# Patient Record
Sex: MALE | Race: WHITE | NOT HISPANIC OR LATINO | Employment: FULL TIME | URBAN - METROPOLITAN AREA
[De-identification: names, ages, dates, MRNs, and addresses within clinical notes are randomized per-mention and may not be internally consistent; named-entity substitution may affect disease eponyms.]

---

## 2023-12-09 ENCOUNTER — HOSPITAL ENCOUNTER (EMERGENCY)
Facility: HOSPITAL | Age: 38
Discharge: HOME | End: 2023-12-10
Attending: EMERGENCY MEDICINE | Admitting: EMERGENCY MEDICINE
Payer: MEDICAID

## 2023-12-09 ENCOUNTER — APPOINTMENT (OUTPATIENT)
Dept: CT IMAGING | Facility: HOSPITAL | Age: 38
End: 2023-12-09
Payer: MEDICAID

## 2023-12-09 DIAGNOSIS — K80.50 BILIARY COLIC: Primary | ICD-10-CM

## 2023-12-09 LAB
ALBUMIN SERPL-MCNC: 4.6 G/DL (ref 3.5–5.2)
ALBUMIN/GLOB SERPL: 2.1 G/DL
ALP SERPL-CCNC: 78 U/L (ref 39–117)
ALT SERPL W P-5'-P-CCNC: 43 U/L (ref 1–41)
ANION GAP SERPL CALCULATED.3IONS-SCNC: 9 MMOL/L (ref 5–15)
AST SERPL-CCNC: 32 U/L (ref 1–40)
BASOPHILS # BLD AUTO: 0.04 10*3/MM3 (ref 0–0.2)
BASOPHILS NFR BLD AUTO: 0.4 % (ref 0–1.5)
BILIRUB SERPL-MCNC: 0.6 MG/DL (ref 0–1.2)
BUN SERPL-MCNC: 16 MG/DL (ref 6–20)
BUN/CREAT SERPL: 12.9 (ref 7–25)
CALCIUM SPEC-SCNC: 9.6 MG/DL (ref 8.6–10.5)
CHLORIDE SERPL-SCNC: 97 MMOL/L (ref 98–107)
CO2 SERPL-SCNC: 28 MMOL/L (ref 22–29)
CREAT SERPL-MCNC: 1.24 MG/DL (ref 0.76–1.27)
DEPRECATED RDW RBC AUTO: 40.6 FL (ref 37–54)
EGFRCR SERPLBLD CKD-EPI 2021: 77.3 ML/MIN/1.73
EOSINOPHIL # BLD AUTO: 0.06 10*3/MM3 (ref 0–0.4)
EOSINOPHIL NFR BLD AUTO: 0.7 % (ref 0.3–6.2)
ERYTHROCYTE [DISTWIDTH] IN BLOOD BY AUTOMATED COUNT: 13 % (ref 12.3–15.4)
GLOBULIN UR ELPH-MCNC: 2.2 GM/DL
GLUCOSE SERPL-MCNC: 124 MG/DL (ref 65–99)
HCT VFR BLD AUTO: 55.2 % (ref 37.5–51)
HGB BLD-MCNC: 18.1 G/DL (ref 13–17.7)
IMM GRANULOCYTES # BLD AUTO: 0.01 10*3/MM3 (ref 0–0.05)
IMM GRANULOCYTES NFR BLD AUTO: 0.1 % (ref 0–0.5)
LIPASE SERPL-CCNC: 25 U/L (ref 13–60)
LYMPHOCYTES # BLD AUTO: 1.59 10*3/MM3 (ref 0.7–3.1)
LYMPHOCYTES NFR BLD AUTO: 17.8 % (ref 19.6–45.3)
MCH RBC QN AUTO: 28.4 PG (ref 26.6–33)
MCHC RBC AUTO-ENTMCNC: 32.8 G/DL (ref 31.5–35.7)
MCV RBC AUTO: 86.7 FL (ref 79–97)
MONOCYTES # BLD AUTO: 0.55 10*3/MM3 (ref 0.1–0.9)
MONOCYTES NFR BLD AUTO: 6.2 % (ref 5–12)
NEUTROPHILS NFR BLD AUTO: 6.66 10*3/MM3 (ref 1.7–7)
NEUTROPHILS NFR BLD AUTO: 74.8 % (ref 42.7–76)
PLATELET # BLD AUTO: 192 10*3/MM3 (ref 140–450)
PMV BLD AUTO: 10.4 FL (ref 6–12)
POTASSIUM SERPL-SCNC: 3.8 MMOL/L (ref 3.5–5.2)
PROT SERPL-MCNC: 6.8 G/DL (ref 6–8.5)
RBC # BLD AUTO: 6.37 10*6/MM3 (ref 4.14–5.8)
SODIUM SERPL-SCNC: 134 MMOL/L (ref 136–145)
TROPONIN T SERPL HS-MCNC: 15 NG/L
WBC NRBC COR # BLD AUTO: 8.91 10*3/MM3 (ref 3.4–10.8)

## 2023-12-09 PROCEDURE — 74177 CT ABD & PELVIS W/CONTRAST: CPT

## 2023-12-09 PROCEDURE — 93005 ELECTROCARDIOGRAM TRACING: CPT | Performed by: EMERGENCY MEDICINE

## 2023-12-09 PROCEDURE — 80053 COMPREHEN METABOLIC PANEL: CPT | Performed by: EMERGENCY MEDICINE

## 2023-12-09 PROCEDURE — 84484 ASSAY OF TROPONIN QUANT: CPT | Performed by: EMERGENCY MEDICINE

## 2023-12-09 PROCEDURE — 96374 THER/PROPH/DIAG INJ IV PUSH: CPT

## 2023-12-09 PROCEDURE — 25010000002 ONDANSETRON PER 1 MG: Performed by: EMERGENCY MEDICINE

## 2023-12-09 PROCEDURE — 83690 ASSAY OF LIPASE: CPT | Performed by: EMERGENCY MEDICINE

## 2023-12-09 PROCEDURE — 85025 COMPLETE CBC W/AUTO DIFF WBC: CPT | Performed by: EMERGENCY MEDICINE

## 2023-12-09 PROCEDURE — 99285 EMERGENCY DEPT VISIT HI MDM: CPT

## 2023-12-09 PROCEDURE — 96375 TX/PRO/DX INJ NEW DRUG ADDON: CPT

## 2023-12-09 RX ORDER — FAMOTIDINE 10 MG/ML
20 INJECTION, SOLUTION INTRAVENOUS ONCE
Status: COMPLETED | OUTPATIENT
Start: 2023-12-10 | End: 2023-12-09

## 2023-12-09 RX ORDER — ONDANSETRON 2 MG/ML
4 INJECTION INTRAMUSCULAR; INTRAVENOUS ONCE
Status: COMPLETED | OUTPATIENT
Start: 2023-12-10 | End: 2023-12-09

## 2023-12-09 RX ORDER — SODIUM CHLORIDE 0.9 % (FLUSH) 0.9 %
10 SYRINGE (ML) INJECTION AS NEEDED
Status: DISCONTINUED | OUTPATIENT
Start: 2023-12-09 | End: 2023-12-10 | Stop reason: HOSPADM

## 2023-12-09 RX ADMIN — ONDANSETRON 4 MG: 2 INJECTION INTRAMUSCULAR; INTRAVENOUS at 23:49

## 2023-12-09 RX ADMIN — FAMOTIDINE 20 MG: 10 INJECTION INTRAVENOUS at 23:49

## 2023-12-09 NOTE — Clinical Note
[facility] [place] [place] [address] [place] [address] [place] [address]  Phone: [phone]    Henrik Mojica was seen and treated in our emergency department on 12/9/2023.  He may return to work on 12/12/2023.         Thank you for choosing [facility].    Kathe Alexander MD

## 2023-12-10 VITALS
BODY MASS INDEX: 31.71 KG/M2 | HEIGHT: 75 IN | WEIGHT: 255 LBS | HEART RATE: 58 BPM | RESPIRATION RATE: 18 BRPM | OXYGEN SATURATION: 98 % | DIASTOLIC BLOOD PRESSURE: 87 MMHG | SYSTOLIC BLOOD PRESSURE: 133 MMHG | TEMPERATURE: 98 F

## 2023-12-10 LAB
BILIRUB UR QL STRIP: NEGATIVE
CLARITY UR: CLEAR
COLOR UR: YELLOW
GLUCOSE UR STRIP-MCNC: NEGATIVE MG/DL
HGB UR QL STRIP.AUTO: NEGATIVE
KETONES UR QL STRIP: NEGATIVE
LEUKOCYTE ESTERASE UR QL STRIP.AUTO: NEGATIVE
NITRITE UR QL STRIP: NEGATIVE
PH UR STRIP.AUTO: 7 [PH] (ref 5–8)
PROT UR QL STRIP: NEGATIVE
SP GR UR STRIP: 1.01 (ref 1–1.03)
UROBILINOGEN UR QL STRIP: NORMAL

## 2023-12-10 PROCEDURE — 96375 TX/PRO/DX INJ NEW DRUG ADDON: CPT

## 2023-12-10 PROCEDURE — 96376 TX/PRO/DX INJ SAME DRUG ADON: CPT

## 2023-12-10 PROCEDURE — 25010000002 KETOROLAC TROMETHAMINE PER 15 MG: Performed by: EMERGENCY MEDICINE

## 2023-12-10 PROCEDURE — 81003 URINALYSIS AUTO W/O SCOPE: CPT | Performed by: EMERGENCY MEDICINE

## 2023-12-10 PROCEDURE — 25510000001 IOPAMIDOL PER 1 ML: Performed by: EMERGENCY MEDICINE

## 2023-12-10 PROCEDURE — 25010000002 ONDANSETRON PER 1 MG: Performed by: EMERGENCY MEDICINE

## 2023-12-10 PROCEDURE — 25010000002 MORPHINE PER 10 MG: Performed by: EMERGENCY MEDICINE

## 2023-12-10 RX ORDER — DICYCLOMINE HYDROCHLORIDE 10 MG/1
10 CAPSULE ORAL 3 TIMES DAILY PRN
Qty: 15 CAPSULE | Refills: 0 | Status: SHIPPED | OUTPATIENT
Start: 2023-12-10

## 2023-12-10 RX ORDER — FAMOTIDINE 20 MG/1
20 TABLET, FILM COATED ORAL NIGHTLY
Qty: 15 TABLET | Refills: 0 | Status: SHIPPED | OUTPATIENT
Start: 2023-12-10

## 2023-12-10 RX ORDER — KETOROLAC TROMETHAMINE 15 MG/ML
15 INJECTION, SOLUTION INTRAMUSCULAR; INTRAVENOUS ONCE
Status: COMPLETED | OUTPATIENT
Start: 2023-12-10 | End: 2023-12-10

## 2023-12-10 RX ORDER — MORPHINE SULFATE 2 MG/ML
2 INJECTION, SOLUTION INTRAMUSCULAR; INTRAVENOUS ONCE
Status: COMPLETED | OUTPATIENT
Start: 2023-12-10 | End: 2023-12-10

## 2023-12-10 RX ORDER — LIDOCAINE HYDROCHLORIDE 20 MG/ML
5 SOLUTION OROPHARYNGEAL ONCE
Status: COMPLETED | OUTPATIENT
Start: 2023-12-10 | End: 2023-12-10

## 2023-12-10 RX ORDER — ONDANSETRON 4 MG/1
4 TABLET, ORALLY DISINTEGRATING ORAL EVERY 8 HOURS PRN
Qty: 12 TABLET | Refills: 0 | Status: SHIPPED | OUTPATIENT
Start: 2023-12-10

## 2023-12-10 RX ORDER — ONDANSETRON 2 MG/ML
4 INJECTION INTRAMUSCULAR; INTRAVENOUS ONCE
Status: COMPLETED | OUTPATIENT
Start: 2023-12-10 | End: 2023-12-10

## 2023-12-10 RX ADMIN — ONDANSETRON 4 MG: 2 INJECTION INTRAMUSCULAR; INTRAVENOUS at 01:46

## 2023-12-10 RX ADMIN — IOPAMIDOL 100 ML: 755 INJECTION, SOLUTION INTRAVENOUS at 00:01

## 2023-12-10 RX ADMIN — LIDOCAINE HYDROCHLORIDE 5 ML: 20 SOLUTION ORAL at 00:47

## 2023-12-10 RX ADMIN — KETOROLAC TROMETHAMINE 15 MG: 15 INJECTION, SOLUTION INTRAMUSCULAR; INTRAVENOUS at 01:13

## 2023-12-10 RX ADMIN — Medication 10 ML: at 01:07

## 2023-12-10 RX ADMIN — MORPHINE SULFATE 2 MG: 2 INJECTION, SOLUTION INTRAMUSCULAR; INTRAVENOUS at 01:45

## 2023-12-10 RX ADMIN — Medication 10 ML: at 01:18

## 2023-12-10 RX ADMIN — ALUMINUM HYDROXIDE, MAGNESIUM HYDROXIDE, AND DIMETHICONE: 400; 400; 40 SUSPENSION ORAL at 00:47

## 2023-12-10 NOTE — FSED PROVIDER NOTE
Subjective   History of Present Illness  38 yom complains of upper abdominal pain. The patient states it started earlier today after eating some fried food. He notes it is in the upper part of his stomach. He has vomited several times. No chest pain, back pain, shortness of breath or diarrhea. No sick contacts or bad food exposure. No recent travel.       Review of Systems   Constitutional: Negative.    Respiratory: Negative.  Negative for chest tightness and shortness of breath.    Cardiovascular: Negative.  Negative for chest pain.   Gastrointestinal:  Positive for abdominal pain, nausea and vomiting.   Musculoskeletal: Negative.  Negative for back pain.   All other systems reviewed and are negative.      History reviewed. No pertinent past medical history.    Allergies   Allergen Reactions   • Penicillins Other (See Comments)     Family history       History reviewed. No pertinent surgical history.    History reviewed. No pertinent family history.    Social History     Socioeconomic History   • Marital status:            Objective   Physical Exam  Vitals reviewed.   Constitutional:       General: He is not in acute distress.     Appearance: He is well-developed. He is not ill-appearing.   HENT:      Head: Normocephalic and atraumatic.      Mouth/Throat:      Mouth: Mucous membranes are moist.      Pharynx: Oropharynx is clear.   Eyes:      Extraocular Movements: Extraocular movements intact.      Pupils: Pupils are equal, round, and reactive to light.   Cardiovascular:      Rate and Rhythm: Normal rate and regular rhythm.      Heart sounds: Normal heart sounds.   Pulmonary:      Effort: Pulmonary effort is normal.      Breath sounds: Normal breath sounds.   Abdominal:      General: Abdomen is flat. Bowel sounds are normal.      Palpations: Abdomen is soft.      Tenderness: There is abdominal tenderness in the epigastric area. There is no guarding or rebound. Negative signs include Hector's sign.   Skin:      General: Skin is warm and dry.      Capillary Refill: Capillary refill takes less than 2 seconds.   Neurological:      Mental Status: He is alert.       ECG 12 Lead      Date/Time: 12/9/2023 10:48 PM    Performed by: Kathe Alexander MD  Authorized by: Kathe Alexander MD  Interpreted by physician  Comparison: not compared with previous ECG   Rhythm: sinus rhythm  Rate: normal  BPM: 62  Conduction: conduction normal  ST Segments: ST segments normal  Clinical impression: normal ECG           ED Course                                           Medical Decision Making    Presentation most consistent with biliary colic, without evidence of infection.  History and exam AAA, pancreatitis, SBO, appendicitis, mesenteric ischemia, nephrolithiasis, pyelonephritis, or diverticulitis.    ED Interventions: analgesia PRN  ED Workup: CBC, BMP, LFTs, Lipase, Abdominal CT    Disposition: Refer to general surgery outpatient. Discharge home with instructions for primary care follow up within 24-48 hours.      Problems Addressed:  Biliary colic: complicated acute illness or injury    Amount and/or Complexity of Data Reviewed  Labs: ordered.  Radiology: ordered.  ECG/medicine tests: ordered.    Risk  Prescription drug management.        Final diagnoses:   Biliary colic       ED Disposition  ED Disposition       ED Disposition   Discharge    Condition   Stable    Comment   --               Subhash Graves MD  [address]  [address] [place] [address] [phone]    Schedule an appointment as soon as possible for a visit on 12/11/2023      Lyndon Yan MD  [address] [facility] [address]  [place] [address] [phone]    Schedule an appointment as soon as possible for a visit   re-evaluation    [facility]  [address] [facility] [address] [phone]  Schedule an appointment as soon as possible for a visit   re-evaluation         Medication List        New  Prescriptions      dicyclomine 10 MG capsule  Commonly known as: BENTYL  Take 1 capsule by mouth 3 (Three) Times a Day As Needed for Abdominal Cramping.     famotidine 20 MG tablet  Commonly known as: PEPCID  Take 1 tablet by mouth Every Night.     ondansetron ODT 4 MG disintegrating tablet  Commonly known as: ZOFRAN-ODT  Place 1 tablet on the tongue Every 8 (Eight) Hours As Needed for Nausea.               Where to Get Your Medications        These medications were sent to [facility]/pharmacy #[id number] - [place], [place] - [address] [place] [address] - [phone]  - [phone] [address] [place] [address]      Hours: 24-hours Phone: [phone]   dicyclomine 10 MG capsule  famotidine 20 MG tablet  ondansetron ODT 4 MG disintegrating tablet

## 2023-12-10 NOTE — DISCHARGE INSTRUCTIONS
Clear liquid diet for the next 24 hours. Take medication as prescribed. Follow-up with Dr Yan this week for further evaluation. Return to the ER if you develop increased pain, vomiting or for any questions or concerns.

## 2023-12-11 LAB
QT INTERVAL: 359 MS
QTC INTERVAL: 365 MS

## 2024-01-04 ENCOUNTER — OFFICE VISIT (OUTPATIENT)
Dept: SURGERY | Facility: CLINIC | Age: 39
End: 2024-01-04

## 2024-01-04 VITALS
RESPIRATION RATE: 18 BRPM | TEMPERATURE: 99.3 F | BODY MASS INDEX: 31.85 KG/M2 | HEIGHT: 75 IN | HEART RATE: 88 BPM | WEIGHT: 256.2 LBS | OXYGEN SATURATION: 99 % | SYSTOLIC BLOOD PRESSURE: 144 MMHG | DIASTOLIC BLOOD PRESSURE: 104 MMHG

## 2024-01-04 DIAGNOSIS — K80.20 CALCULUS OF GALLBLADDER WITHOUT CHOLECYSTITIS WITHOUT OBSTRUCTION: Primary | ICD-10-CM

## 2024-01-04 PROCEDURE — 99204 OFFICE O/P NEW MOD 45 MIN: CPT | Performed by: SURGERY

## 2024-01-04 RX ORDER — METOPROLOL SUCCINATE 25 MG/1
1 TABLET, EXTENDED RELEASE ORAL DAILY
COMMUNITY
Start: 2023-10-08

## 2024-01-04 RX ORDER — VITAMIN E 268 MG
CAPSULE ORAL
COMMUNITY
Start: 2023-10-08

## 2024-01-04 RX ORDER — LISINOPRIL AND HYDROCHLOROTHIAZIDE 25; 20 MG/1; MG/1
1 TABLET ORAL DAILY
COMMUNITY
Start: 2023-10-08

## 2024-01-04 RX ORDER — BUSPIRONE HYDROCHLORIDE 10 MG/1
1 TABLET ORAL 2 TIMES DAILY
COMMUNITY
Start: 2023-12-17

## 2024-01-04 RX ORDER — TESTOSTERONE CYPIONATE 200 MG/ML
INJECTION, SOLUTION INTRAMUSCULAR
COMMUNITY
Start: 2023-12-04

## 2024-01-04 RX ORDER — TRAZODONE HYDROCHLORIDE 50 MG/1
TABLET ORAL
COMMUNITY
Start: 2023-10-08

## 2024-01-04 NOTE — PROGRESS NOTES
"Anastacio Mojica is a 38 y.o. male.   Chief Complaint   Patient presents with    Abdominal Pain     New pt ref [facility] ER, Gallbladder      BP (!) 144/104 (BP Location: Left arm, Patient Position: Sitting, Cuff Size: Large Adult)   Pulse 88   Temp 99.3 °F (37.4 °C) (Infrared)   Resp 18   Ht 190.5 cm (75\")   Wt 116 kg (256 lb 3.2 oz)   SpO2 99%   BMI 32.02 kg/m²     HISTORY OF PRESENT ILLNESS:  38-year-old gentleman referred over to me after his visit [facility] for upper abdominal pain.  He says he has been having symptoms for several years intermittently.  He noticed a sharp right upper quadrant abdominal pain that was progressive lasted about 4 to 5 hours was worse than he had before so ultimately he went to go get checked out.  At the time of his evaluation he did not have a significant leukocytosis and his LFTs were normal.  CT scan demonstrated cholelithiasis with the stones in the cystic duct.  He was discharged and sent home with outpatient follow-up instructions.  He said he did some independent research she has made some dietary changes which have significantly helped him.  He says that his pain is gradually resolved he has not had any right upper quad abdominal symptoms since the ER.  He has been having regular bowel function denies any nausea or vomiting jaundice scleral icterus acholic stools or dark discolored urine      Outpatient Encounter Medications as of 1/4/2024   Medication Sig Dispense Refill    busPIRone (BUSPAR) 10 MG tablet Take 1 tablet by mouth 2 (Two) Times a Day.      dicyclomine (BENTYL) 10 MG capsule Take 1 capsule by mouth 3 (Three) Times a Day As Needed for Abdominal Cramping. 15 capsule 0    famotidine (PEPCID) 20 MG tablet Take 1 tablet by mouth Every Night. 15 tablet 0    lisinopril-hydrochlorothiazide (PRINZIDE,ZESTORETIC) 20-25 MG per tablet Take 1 tablet by mouth Daily.      metoprolol succinate XL (TOPROL-XL) 25 MG 24 hr tablet Take 1 tablet by mouth Daily.   "    ondansetron ODT (ZOFRAN-ODT) 4 MG disintegrating tablet Place 1 tablet on the tongue Every 8 (Eight) Hours As Needed for Nausea. 12 tablet 0    Testosterone Cypionate (DEPOTESTOTERONE CYPIONATE) 200 MG/ML injection INJECT 0.5 ML INTO THE MUSCLE EVERY WEEK      traZODone (DESYREL) 50 MG tablet       Vitamin E 180 MG (400 UNIT) capsule capsule        No facility-administered encounter medications on file as of 2024.     Past Medical History:   Diagnosis Date    Cholelithiasis 25    Hypertension ?    On 2 medications for it.    Substance abuse 2005    Drug free since 2014     No past surgical history on file.  No family history on file.    Social History     Tobacco Use    Smoking status: Former     Packs/day: 2.00     Years: 20.00     Additional pack years: 0.00     Total pack years: 40.00     Types: Cigarettes, Cigars     Start date: 1999     Quit date: 2022     Years since quittin.3     Passive exposure: Past    Smokeless tobacco: Never   Vaping Use    Vaping Use: Never used   Substance Use Topics    Alcohol use: Not Currently    Drug use: Not Currently     Types: Cocaine(coke), Heroin, Hydrocodone, Morphine, Oxycodone     Comment: Drug free since 2014     Penicillins    Review of Systems  Complete review of system has been obtained and is positive for appetite change back pain the remainder of the review of systems is negative  Objective     Physical Exam  Constitutional:       Appearance: Normal appearance.   HENT:      Head: Normocephalic and atraumatic.   Cardiovascular:      Rate and Rhythm: Normal rate.   Pulmonary:      Effort: Pulmonary effort is normal. No respiratory distress.   Abdominal:      General: There is no distension.      Palpations: Abdomen is soft.   Skin:     General: Skin is warm and dry.   Neurological:      General: No focal deficit present.      Mental Status: He is alert. Mental status is at baseline.   Psychiatric:         Mood and Affect: Mood normal.          Behavior: Behavior normal.           Assessment & Plan   Diagnoses and all orders for this visit:    1. Calculus of gallbladder without cholecystitis without obstruction (Primary)    Talked him about the likely diagnosis of symptomatic cholelithiasis versus a cholecystitis which has improved.  Talked with the natural history of these gallstones the risk of cholecystitis or bile duct stones and pancreatitis.  We talked about the steps of cholecystectomy anticipated recover the possible complications.  We talked about when to seek immediate medical attention.  After our discussion for a number of reasons he is try to decide about whether he wants to move forward with surgery but ultimately wants to hold off for now.  I have offered him a 3 to 4-month follow-up to reassess symptoms.  Also counseled him that if he ever ends up in the emergency department with recurrent symptoms to have some to give me a call and we will be more than willing to perform his cholecystectomy.  Understands the risk of surgery and the risk of observation and are moving forward as above    This is a chronic problem with progression of symptoms and counseling about a major abdominal surgery without significant risk factors for morbidity    Lyndon Yan MD  1/4/2024  3:21 PM EST    This note was created using Dragon Voice Recognition software.

## 2024-01-10 ENCOUNTER — OFFICE (OUTPATIENT)
Dept: URBAN - METROPOLITAN AREA CLINIC 64 | Facility: CLINIC | Age: 39
End: 2024-01-10

## 2024-01-10 VITALS
DIASTOLIC BLOOD PRESSURE: 93 MMHG | HEART RATE: 106 BPM | HEIGHT: 75 IN | WEIGHT: 269 LBS | SYSTOLIC BLOOD PRESSURE: 132 MMHG

## 2024-01-10 DIAGNOSIS — R94.5 ABNORMAL RESULTS OF LIVER FUNCTION STUDIES: ICD-10-CM

## 2024-01-10 DIAGNOSIS — R10.11 RIGHT UPPER QUADRANT PAIN: ICD-10-CM

## 2024-01-10 PROCEDURE — 99203 OFFICE O/P NEW LOW 30 MIN: CPT | Performed by: INTERNAL MEDICINE

## 2024-05-27 ENCOUNTER — APPOINTMENT (OUTPATIENT)
Dept: ULTRASOUND IMAGING | Facility: HOSPITAL | Age: 39
End: 2024-05-27
Payer: MEDICAID

## 2024-05-27 ENCOUNTER — HOSPITAL ENCOUNTER (EMERGENCY)
Facility: HOSPITAL | Age: 39
Discharge: HOME | End: 2024-05-27
Attending: EMERGENCY MEDICINE | Admitting: EMERGENCY MEDICINE
Payer: MEDICAID

## 2024-05-27 VITALS
DIASTOLIC BLOOD PRESSURE: 98 MMHG | OXYGEN SATURATION: 96 % | HEART RATE: 88 BPM | HEIGHT: 75 IN | WEIGHT: 250 LBS | TEMPERATURE: 97.4 F | SYSTOLIC BLOOD PRESSURE: 173 MMHG | BODY MASS INDEX: 31.08 KG/M2 | RESPIRATION RATE: 16 BRPM

## 2024-05-27 DIAGNOSIS — K80.50 BILIARY COLIC: Primary | ICD-10-CM

## 2024-05-27 LAB
ALBUMIN SERPL-MCNC: 4.1 G/DL (ref 3.5–5.2)
ALBUMIN/GLOB SERPL: 1.7 G/DL
ALP SERPL-CCNC: 122 U/L (ref 39–117)
ALT SERPL W P-5'-P-CCNC: 56 U/L (ref 1–41)
ANION GAP SERPL CALCULATED.3IONS-SCNC: 11.2 MMOL/L (ref 5–15)
AST SERPL-CCNC: 33 U/L (ref 1–40)
BASOPHILS # BLD AUTO: 0.04 10*3/MM3 (ref 0–0.2)
BASOPHILS NFR BLD AUTO: 0.9 % (ref 0–1.5)
BILIRUB SERPL-MCNC: 0.6 MG/DL (ref 0–1.2)
BILIRUB UR QL STRIP: ABNORMAL
BUN SERPL-MCNC: 16 MG/DL (ref 6–20)
BUN/CREAT SERPL: 14.3 (ref 7–25)
CALCIUM SPEC-SCNC: 9 MG/DL (ref 8.6–10.5)
CHLORIDE SERPL-SCNC: 100 MMOL/L (ref 98–107)
CLARITY UR: CLEAR
CO2 SERPL-SCNC: 22.8 MMOL/L (ref 22–29)
COLOR UR: YELLOW
CREAT SERPL-MCNC: 1.12 MG/DL (ref 0.76–1.27)
DEPRECATED RDW RBC AUTO: 38.5 FL (ref 37–54)
EGFRCR SERPLBLD CKD-EPI 2021: 86.8 ML/MIN/1.73
EOSINOPHIL # BLD AUTO: 0.09 10*3/MM3 (ref 0–0.4)
EOSINOPHIL NFR BLD AUTO: 1.9 % (ref 0.3–6.2)
ERYTHROCYTE [DISTWIDTH] IN BLOOD BY AUTOMATED COUNT: 12.6 % (ref 12.3–15.4)
GLOBULIN UR ELPH-MCNC: 2.4 GM/DL
GLUCOSE SERPL-MCNC: 145 MG/DL (ref 65–99)
GLUCOSE UR STRIP-MCNC: NEGATIVE MG/DL
HCT VFR BLD AUTO: 49.6 % (ref 37.5–51)
HGB BLD-MCNC: 16.2 G/DL (ref 13–17.7)
HGB UR QL STRIP.AUTO: NEGATIVE
IMM GRANULOCYTES # BLD AUTO: 0 10*3/MM3 (ref 0–0.05)
IMM GRANULOCYTES NFR BLD AUTO: 0 % (ref 0–0.5)
KETONES UR QL STRIP: ABNORMAL
LEUKOCYTE ESTERASE UR QL STRIP.AUTO: NEGATIVE
LIPASE SERPL-CCNC: 30 U/L (ref 13–60)
LYMPHOCYTES # BLD AUTO: 0.92 10*3/MM3 (ref 0.7–3.1)
LYMPHOCYTES NFR BLD AUTO: 19.7 % (ref 19.6–45.3)
MCH RBC QN AUTO: 27 PG (ref 26.6–33)
MCHC RBC AUTO-ENTMCNC: 32.7 G/DL (ref 31.5–35.7)
MCV RBC AUTO: 82.7 FL (ref 79–97)
MONOCYTES # BLD AUTO: 0.34 10*3/MM3 (ref 0.1–0.9)
MONOCYTES NFR BLD AUTO: 7.3 % (ref 5–12)
NEUTROPHILS NFR BLD AUTO: 3.29 10*3/MM3 (ref 1.7–7)
NEUTROPHILS NFR BLD AUTO: 70.2 % (ref 42.7–76)
NITRITE UR QL STRIP: NEGATIVE
PH UR STRIP.AUTO: 6 [PH] (ref 5–8)
PLATELET # BLD AUTO: 196 10*3/MM3 (ref 140–450)
PMV BLD AUTO: 10.1 FL (ref 6–12)
POTASSIUM SERPL-SCNC: 3.8 MMOL/L (ref 3.5–5.2)
PROT SERPL-MCNC: 6.5 G/DL (ref 6–8.5)
PROT UR QL STRIP: ABNORMAL
RBC # BLD AUTO: 6 10*6/MM3 (ref 4.14–5.8)
SODIUM SERPL-SCNC: 134 MMOL/L (ref 136–145)
SP GR UR STRIP: 1.02 (ref 1–1.03)
UROBILINOGEN UR QL STRIP: ABNORMAL
WBC NRBC COR # BLD AUTO: 4.68 10*3/MM3 (ref 3.4–10.8)

## 2024-05-27 PROCEDURE — 96374 THER/PROPH/DIAG INJ IV PUSH: CPT

## 2024-05-27 PROCEDURE — 96372 THER/PROPH/DIAG INJ SC/IM: CPT

## 2024-05-27 PROCEDURE — 25010000002 KETOROLAC TROMETHAMINE PER 15 MG: Performed by: EMERGENCY MEDICINE

## 2024-05-27 PROCEDURE — 99284 EMERGENCY DEPT VISIT MOD MDM: CPT

## 2024-05-27 PROCEDURE — 25810000003 SODIUM CHLORIDE 0.9 % SOLUTION: Performed by: EMERGENCY MEDICINE

## 2024-05-27 PROCEDURE — 76705 ECHO EXAM OF ABDOMEN: CPT

## 2024-05-27 PROCEDURE — 85025 COMPLETE CBC W/AUTO DIFF WBC: CPT | Performed by: EMERGENCY MEDICINE

## 2024-05-27 PROCEDURE — 83690 ASSAY OF LIPASE: CPT | Performed by: EMERGENCY MEDICINE

## 2024-05-27 PROCEDURE — 81003 URINALYSIS AUTO W/O SCOPE: CPT | Performed by: EMERGENCY MEDICINE

## 2024-05-27 PROCEDURE — 99284 EMERGENCY DEPT VISIT MOD MDM: CPT | Performed by: EMERGENCY MEDICINE

## 2024-05-27 PROCEDURE — 25010000002 ONDANSETRON PER 1 MG: Performed by: EMERGENCY MEDICINE

## 2024-05-27 PROCEDURE — 80053 COMPREHEN METABOLIC PANEL: CPT | Performed by: EMERGENCY MEDICINE

## 2024-05-27 PROCEDURE — 96375 TX/PRO/DX INJ NEW DRUG ADDON: CPT

## 2024-05-27 PROCEDURE — 25010000002 DICYCLOMINE PER 20 MG: Performed by: EMERGENCY MEDICINE

## 2024-05-27 RX ORDER — DICYCLOMINE HYDROCHLORIDE 10 MG/ML
20 INJECTION INTRAMUSCULAR ONCE
Status: COMPLETED | OUTPATIENT
Start: 2024-05-27 | End: 2024-05-27

## 2024-05-27 RX ORDER — DICYCLOMINE HCL 20 MG
20 TABLET ORAL EVERY 8 HOURS PRN
Qty: 30 TABLET | Refills: 0 | Status: SHIPPED | OUTPATIENT
Start: 2024-05-27

## 2024-05-27 RX ORDER — ONDANSETRON 2 MG/ML
4 INJECTION INTRAMUSCULAR; INTRAVENOUS ONCE
Status: COMPLETED | OUTPATIENT
Start: 2024-05-27 | End: 2024-05-27

## 2024-05-27 RX ORDER — KETOROLAC TROMETHAMINE 30 MG/ML
15 INJECTION, SOLUTION INTRAMUSCULAR; INTRAVENOUS ONCE
Status: COMPLETED | OUTPATIENT
Start: 2024-05-27 | End: 2024-05-27

## 2024-05-27 RX ADMIN — ONDANSETRON 4 MG: 2 INJECTION INTRAMUSCULAR; INTRAVENOUS at 06:32

## 2024-05-27 RX ADMIN — DICYCLOMINE HYDROCHLORIDE 20 MG: 20 INJECTION, SOLUTION INTRAMUSCULAR at 07:58

## 2024-05-27 RX ADMIN — KETOROLAC TROMETHAMINE 15 MG: 30 INJECTION, SOLUTION INTRAMUSCULAR at 06:32

## 2024-05-27 RX ADMIN — SODIUM CHLORIDE 1000 ML: 9 INJECTION, SOLUTION INTRAVENOUS at 06:41

## 2024-05-27 NOTE — DISCHARGE INSTRUCTIONS
Take medication as prescribed.  As discussed see general surgeon when you have the money or insurance to discuss having her gallbladder taken out.  Please return if you develop severe pain that does not resolve on its own.

## 2024-05-27 NOTE — FSED PROVIDER NOTE
HPI: 39-year-old male heating and  arrives complaining of right-sided abdominal pain radiating into his back and up into his rib cage.  In December he was diagnosed with cholelithiasis but has deferred cholecystectomy secondary to financial considerations.  He also has a fat-containing small to moderate size inguinal hernia that he says has not been a problem for him.  Tonight he woke at 1:30 AM with acute severe right-sided pain.    PMFSH: see problem list... History of opioid/opiate dependence remotely and therefore refuses narcotic related pain relievers presently.  Former smoker, nondrinker.    ROS: As above.  All other systems are reviewed and negative.    PHYSICAL EXAM: This is a well-developed well-nourished muscular gentleman hirsute relaxed and reclining cooperative BMI 31    ENT moist mucous membranes    Neck supple    Lungs clear    Heart regular rate and rhythm without murmur rub or gallop    Abdomen is soft mildly tender without peritoneal findings in the right upper quadrant without significant CVA tenderness    Skin warm and dry    MDM: Patient with right-sided abdominal pain, recurrent, history of gallstones.  Consider biliary colic, choledocholithiasis, cystic duct stone impaction, pancreatitis, gallstone pancreatitis, atypical presentation of urinary pathology such as stone, atypical appendicitis, acute cholecystitis.  We will check blood and urine and treat with analgesics antiemetics and IV fluids.  The patient refuses narcotics and will be given ketorolac.        ED COURSE: Lipase is 30, CMP is normal except glucose 145 and sodium 134, ALT 56 and alk phos 122, CBC with differential is normal and urinalysis is pending at the time of this dictation.  The patient was given ketorolac, Zofran, and fluids, and signed out to Dr. Elliott at 700 hours.    DIAGNOSIS: History of gallstones, history of right inguinal hernia, right-sided abdominal pain.    DISPOSITION: Pending at  the time of this dictation.    Addendum: Care from Dr. Rodriguez at shift change.  Patient with a history of gallstones and biliary colic presents after waking up this morning about 130 with severe right upper quadrant pain.  He says it is better than it was but still rates it at a 7 out of 10.  On exam he has equivocal Hector's, giving him some Bentyl and will ultrasound but his labs are fairly reassuring.      Given Bentyl, ultrasound shows no evidence for cholecystitis.  Patient more comfortable with discharge home, encouraged to discuss cash discount with surgery, patient says he will do this but he is also working on getting insurance.

## 2024-09-02 ENCOUNTER — HOSPITAL ENCOUNTER (EMERGENCY)
Facility: HOSPITAL | Age: 39
Discharge: HOME | End: 2024-09-02
Attending: EMERGENCY MEDICINE
Payer: MEDICAID

## 2024-09-02 VITALS
HEART RATE: 76 BPM | RESPIRATION RATE: 16 BRPM | DIASTOLIC BLOOD PRESSURE: 81 MMHG | BODY MASS INDEX: 29.19 KG/M2 | TEMPERATURE: 98.2 F | SYSTOLIC BLOOD PRESSURE: 117 MMHG | OXYGEN SATURATION: 95 % | WEIGHT: 234.8 LBS | HEIGHT: 75 IN

## 2024-09-02 DIAGNOSIS — R10.11 ABDOMINAL PAIN, RUQ: Primary | ICD-10-CM

## 2024-09-02 LAB
ALBUMIN SERPL-MCNC: 4.2 G/DL (ref 3.5–5.2)
ALBUMIN/GLOB SERPL: 1.9 G/DL
ALP SERPL-CCNC: 73 U/L (ref 39–117)
ALT SERPL W P-5'-P-CCNC: 25 U/L (ref 1–41)
ANION GAP SERPL CALCULATED.3IONS-SCNC: 8.3 MMOL/L (ref 5–15)
AST SERPL-CCNC: 23 U/L (ref 1–40)
BASOPHILS # BLD AUTO: 0.06 10*3/MM3 (ref 0–0.2)
BASOPHILS NFR BLD AUTO: 0.9 % (ref 0–1.5)
BILIRUB SERPL-MCNC: 0.5 MG/DL (ref 0–1.2)
BUN SERPL-MCNC: 18 MG/DL (ref 6–20)
BUN/CREAT SERPL: 14.8 (ref 7–25)
CALCIUM SPEC-SCNC: 9.7 MG/DL (ref 8.6–10.5)
CHLORIDE SERPL-SCNC: 101 MMOL/L (ref 98–107)
CO2 SERPL-SCNC: 26.7 MMOL/L (ref 22–29)
CREAT SERPL-MCNC: 1.22 MG/DL (ref 0.76–1.27)
DEPRECATED RDW RBC AUTO: 42.3 FL (ref 37–54)
EGFRCR SERPLBLD CKD-EPI 2021: 78.3 ML/MIN/1.73
EOSINOPHIL # BLD AUTO: 0.12 10*3/MM3 (ref 0–0.4)
EOSINOPHIL NFR BLD AUTO: 1.9 % (ref 0.3–6.2)
ERYTHROCYTE [DISTWIDTH] IN BLOOD BY AUTOMATED COUNT: 13.8 % (ref 12.3–15.4)
GLOBULIN UR ELPH-MCNC: 2.2 GM/DL
GLUCOSE SERPL-MCNC: 103 MG/DL (ref 65–99)
HCT VFR BLD AUTO: 47.5 % (ref 37.5–51)
HGB BLD-MCNC: 15.4 G/DL (ref 13–17.7)
IMM GRANULOCYTES # BLD AUTO: 0.01 10*3/MM3 (ref 0–0.05)
IMM GRANULOCYTES NFR BLD AUTO: 0.2 % (ref 0–0.5)
LIPASE SERPL-CCNC: 39 U/L (ref 13–60)
LYMPHOCYTES # BLD AUTO: 2.22 10*3/MM3 (ref 0.7–3.1)
LYMPHOCYTES NFR BLD AUTO: 34.8 % (ref 19.6–45.3)
MCH RBC QN AUTO: 27 PG (ref 26.6–33)
MCHC RBC AUTO-ENTMCNC: 32.4 G/DL (ref 31.5–35.7)
MCV RBC AUTO: 83.3 FL (ref 79–97)
MONOCYTES # BLD AUTO: 0.42 10*3/MM3 (ref 0.1–0.9)
MONOCYTES NFR BLD AUTO: 6.6 % (ref 5–12)
NEUTROPHILS NFR BLD AUTO: 3.55 10*3/MM3 (ref 1.7–7)
NEUTROPHILS NFR BLD AUTO: 55.6 % (ref 42.7–76)
PLATELET # BLD AUTO: 154 10*3/MM3 (ref 140–450)
PMV BLD AUTO: 9.9 FL (ref 6–12)
POTASSIUM SERPL-SCNC: 3.8 MMOL/L (ref 3.5–5.2)
PROT SERPL-MCNC: 6.4 G/DL (ref 6–8.5)
RBC # BLD AUTO: 5.7 10*6/MM3 (ref 4.14–5.8)
SODIUM SERPL-SCNC: 136 MMOL/L (ref 136–145)
WBC NRBC COR # BLD AUTO: 6.38 10*3/MM3 (ref 3.4–10.8)

## 2024-09-02 PROCEDURE — 96375 TX/PRO/DX INJ NEW DRUG ADDON: CPT

## 2024-09-02 PROCEDURE — 99283 EMERGENCY DEPT VISIT LOW MDM: CPT

## 2024-09-02 PROCEDURE — 25810000003 SODIUM CHLORIDE 0.9 % SOLUTION: Performed by: EMERGENCY MEDICINE

## 2024-09-02 PROCEDURE — 96374 THER/PROPH/DIAG INJ IV PUSH: CPT

## 2024-09-02 PROCEDURE — 25010000002 MORPHINE PER 10 MG: Performed by: EMERGENCY MEDICINE

## 2024-09-02 PROCEDURE — 85025 COMPLETE CBC W/AUTO DIFF WBC: CPT | Performed by: EMERGENCY MEDICINE

## 2024-09-02 PROCEDURE — 25010000002 ONDANSETRON PER 1 MG: Performed by: EMERGENCY MEDICINE

## 2024-09-02 PROCEDURE — 96376 TX/PRO/DX INJ SAME DRUG ADON: CPT

## 2024-09-02 PROCEDURE — 80053 COMPREHEN METABOLIC PANEL: CPT | Performed by: EMERGENCY MEDICINE

## 2024-09-02 PROCEDURE — 99283 EMERGENCY DEPT VISIT LOW MDM: CPT | Performed by: EMERGENCY MEDICINE

## 2024-09-02 PROCEDURE — 83690 ASSAY OF LIPASE: CPT | Performed by: EMERGENCY MEDICINE

## 2024-09-02 RX ORDER — ONDANSETRON 2 MG/ML
4 INJECTION INTRAMUSCULAR; INTRAVENOUS ONCE
Status: COMPLETED | OUTPATIENT
Start: 2024-09-02 | End: 2024-09-02

## 2024-09-02 RX ORDER — HYDROCODONE BITARTRATE AND ACETAMINOPHEN 5; 325 MG/1; MG/1
1 TABLET ORAL EVERY 6 HOURS PRN
Qty: 12 TABLET | Refills: 0 | Status: SHIPPED | OUTPATIENT
Start: 2024-09-02

## 2024-09-02 RX ORDER — ONDANSETRON 4 MG/1
4 TABLET, ORALLY DISINTEGRATING ORAL EVERY 6 HOURS PRN
Qty: 15 TABLET | Refills: 0 | Status: SHIPPED | OUTPATIENT
Start: 2024-09-02

## 2024-09-02 RX ORDER — SODIUM CHLORIDE 0.9 % (FLUSH) 0.9 %
10 SYRINGE (ML) INJECTION AS NEEDED
Status: DISCONTINUED | OUTPATIENT
Start: 2024-09-02 | End: 2024-09-03 | Stop reason: HOSPADM

## 2024-09-02 RX ADMIN — MORPHINE SULFATE 4 MG: 4 INJECTION INTRAVENOUS at 23:21

## 2024-09-02 RX ADMIN — MORPHINE SULFATE 4 MG: 4 INJECTION INTRAVENOUS at 22:29

## 2024-09-02 RX ADMIN — ONDANSETRON 4 MG: 2 INJECTION INTRAMUSCULAR; INTRAVENOUS at 22:28

## 2024-09-02 RX ADMIN — SODIUM CHLORIDE 1000 ML: 0.9 INJECTION, SOLUTION INTRAVENOUS at 22:28

## 2024-09-03 NOTE — DISCHARGE INSTRUCTIONS
Please take medication as prescribed.  Recommend clear liquids and ice chips for the next 6 to 12 hours and advance diet slowly as tolerated.  Consider follow-up with general surgery to discuss gallbladder surgery.  Seek immediate medical attention having worsening symptoms or fevers or vomiting or any concerns.

## 2024-09-03 NOTE — FSED PROVIDER NOTE
Subjective   History of Present Illness    Patient is a 39-year-old man with history of cholelithiasis.  He has been seen here in the past and worked up and referred to specialist who advised him that he may need elective cholecystectomy.  Due to cost issues patient has not had this done.  He states he developed similar symptoms 1 day ago with right upper quadrant which is crampy and mild to moderate intensity and worsened this evening.  Has had no nausea or vomiting or fevers or back pain or lower abdominal pain.  No chest pain or shortness of breath.    Review of Systems    Past Medical History:   Diagnosis Date    Cholelithiasis 25    Hypertension ?    On 2 medications for it.    Substance abuse 2005    Drug free since 2014       Allergies   Allergen Reactions    Penicillins Other (See Comments)     Family history       History reviewed. No pertinent surgical history.    History reviewed. No pertinent family history.    Social History     Socioeconomic History    Marital status:    Tobacco Use    Smoking status: Former     Current packs/day: 0.00     Average packs/day: 2.0 packs/day for 23.7 years (47.3 ttl pk-yrs)     Types: Cigarettes, Cigars     Start date: 1999     Quit date: 2022     Years since quittin.0     Passive exposure: Past    Smokeless tobacco: Never   Vaping Use    Vaping status: Never Used   Substance and Sexual Activity    Alcohol use: Not Currently    Drug use: Not Currently     Types: Cocaine(coke), Heroin, Hydrocodone, Morphine, Oxycodone     Comment: Drug free since 2014    Sexual activity: Yes     Partners: Female           Objective   Physical Exam  Vitals and nursing note reviewed.   Constitutional:       General: He is in acute distress.      Appearance: He is well-developed. He is not ill-appearing or toxic-appearing.   HENT:      Head: Normocephalic and atraumatic.      Nose: Nose normal.      Mouth/Throat:      Mouth: Mucous membranes are moist.    Eyes:      Extraocular Movements: Extraocular movements intact.      Pupils: Pupils are equal, round, and reactive to light.   Cardiovascular:      Rate and Rhythm: Normal rate and regular rhythm.      Pulses: Normal pulses.      Heart sounds: Normal heart sounds.   Pulmonary:      Effort: Pulmonary effort is normal.      Breath sounds: Normal breath sounds.   Abdominal:      General: Bowel sounds are normal.      Palpations: Abdomen is soft.      Tenderness: There is abdominal tenderness. There is no right CVA tenderness, left CVA tenderness or guarding.      Comments: Tenderness to the right upper quadrant without any guarding.  No lower abdominal pain.  Bowel sounds normal.   Musculoskeletal:         General: No swelling or tenderness. Normal range of motion.      Cervical back: Normal range of motion and neck supple.      Right lower leg: No edema.      Left lower leg: No edema.   Skin:     General: Skin is warm and dry.      Capillary Refill: Capillary refill takes less than 2 seconds.   Neurological:      General: No focal deficit present.      Mental Status: He is alert.         Procedures           ED Course                                           Medical Decision Making  Problems Addressed:  Abdominal pain, RUQ: complicated acute illness or injury    Amount and/or Complexity of Data Reviewed  Labs: ordered.    Risk  Prescription drug management.    Patient is a 39-year-old man who presents complaining of right upper quadrant pain which is crampy in nature.  Symptoms began 1 day ago and worsened today.  He has been seen in the past with similar symptoms and advised he had cholelithiasis and referred to a specialist and advised him of elective cholecystectomy.  Due to cost issues the patient has not had his gallbladder removed.  He denies any fevers or nausea or vomiting.  On examination he appears to be in mild distress but nontoxic-appearing.  He does have right upper quadrant pain without any guarding.   No lower abdominal pain.  IV will be established and labs obtained.  Labs are unremarkable.  Patient is feeling much better and pain is down to 4 out of 10.  Abdomen is soft with minimal pain without any guarding.  Patient advised to follow-up with general surgery again and to discuss elective cholecystectomy however if he were to have worsening symptoms or fevers or vomiting he is either to return or go directly to the hospital for reevaluation and understands he may need to have emergent surgery for infected gallbladder.  Do not suspect this at this time.  Patient voiced understanding of this plan.  Patient prescribed Norco and inspect report has been obtained.    Final diagnoses:   Abdominal pain, RUQ       ED Disposition  ED Disposition       ED Disposition   Discharge    Condition   Stable    Comment   --               Subhash Graves MD  [address] [place] [address] [place] [address] [phone]    In 1 week      Keri Hardin MD  [address] [facility] [address]  [place] [address]  [phone]    Call            Medication List        New Prescriptions      HYDROcodone-acetaminophen 5-325 MG per tablet  Commonly known as: NORCO  Take 1 tablet by mouth Every 6 (Six) Hours As Needed (pain).     ondansetron ODT 4 MG disintegrating tablet  Commonly known as: ZOFRAN-ODT  Place 1 tablet on the tongue Every 6 (Six) Hours As Needed for Vomiting or Nausea.               Where to Get Your Medications        These medications were sent to [id number] - [place], [place] - [address] - [phone]  - [phone] FX  [address] [place] [address]      Phone: [phone]   HYDROcodone-acetaminophen 5-325 MG per tablet  ondansetron ODT 4 MG disintegrating tablet

## 2024-09-23 ENCOUNTER — HOSPITAL ENCOUNTER (EMERGENCY)
Facility: HOSPITAL | Age: 39
Discharge: HOME | End: 2024-09-24
Attending: EMERGENCY MEDICINE | Admitting: EMERGENCY MEDICINE

## 2024-09-23 VITALS
RESPIRATION RATE: 18 BRPM | TEMPERATURE: 97.9 F | BODY MASS INDEX: 29.46 KG/M2 | HEIGHT: 75 IN | SYSTOLIC BLOOD PRESSURE: 150 MMHG | WEIGHT: 236.9 LBS | OXYGEN SATURATION: 99 % | DIASTOLIC BLOOD PRESSURE: 88 MMHG | HEART RATE: 70 BPM

## 2024-09-23 DIAGNOSIS — K80.50 BILIARY COLIC: Primary | ICD-10-CM

## 2024-09-23 PROCEDURE — 99283 EMERGENCY DEPT VISIT LOW MDM: CPT

## 2024-09-23 PROCEDURE — 80053 COMPREHEN METABOLIC PANEL: CPT | Performed by: EMERGENCY MEDICINE

## 2024-09-23 PROCEDURE — 83690 ASSAY OF LIPASE: CPT | Performed by: EMERGENCY MEDICINE

## 2024-09-23 PROCEDURE — 85025 COMPLETE CBC W/AUTO DIFF WBC: CPT | Performed by: EMERGENCY MEDICINE

## 2024-09-24 LAB
ALBUMIN SERPL-MCNC: 4.5 G/DL (ref 3.5–5.2)
ALBUMIN/GLOB SERPL: 1.9 G/DL
ALP SERPL-CCNC: 78 U/L (ref 39–117)
ALT SERPL W P-5'-P-CCNC: 26 U/L (ref 1–41)
ANION GAP SERPL CALCULATED.3IONS-SCNC: 7 MMOL/L (ref 5–15)
AST SERPL-CCNC: 23 U/L (ref 1–40)
BASOPHILS # BLD AUTO: 0.07 10*3/MM3 (ref 0–0.2)
BASOPHILS NFR BLD AUTO: 1.1 % (ref 0–1.5)
BILIRUB SERPL-MCNC: 0.7 MG/DL (ref 0–1.2)
BUN SERPL-MCNC: 12 MG/DL (ref 6–20)
BUN/CREAT SERPL: 9.2 (ref 7–25)
CALCIUM SPEC-SCNC: 9.6 MG/DL (ref 8.6–10.5)
CHLORIDE SERPL-SCNC: 99 MMOL/L (ref 98–107)
CO2 SERPL-SCNC: 32 MMOL/L (ref 22–29)
CREAT SERPL-MCNC: 1.3 MG/DL (ref 0.76–1.27)
DEPRECATED RDW RBC AUTO: 40.4 FL (ref 37–54)
EGFRCR SERPLBLD CKD-EPI 2021: 72.6 ML/MIN/1.73
EOSINOPHIL # BLD AUTO: 0.09 10*3/MM3 (ref 0–0.4)
EOSINOPHIL NFR BLD AUTO: 1.4 % (ref 0.3–6.2)
ERYTHROCYTE [DISTWIDTH] IN BLOOD BY AUTOMATED COUNT: 13.2 % (ref 12.3–15.4)
GLOBULIN UR ELPH-MCNC: 2.4 GM/DL
GLUCOSE SERPL-MCNC: 97 MG/DL (ref 65–99)
HCT VFR BLD AUTO: 51.4 % (ref 37.5–51)
HGB BLD-MCNC: 16.7 G/DL (ref 13–17.7)
IMM GRANULOCYTES # BLD AUTO: 0.01 10*3/MM3 (ref 0–0.05)
IMM GRANULOCYTES NFR BLD AUTO: 0.2 % (ref 0–0.5)
LIPASE SERPL-CCNC: 42 U/L (ref 13–60)
LYMPHOCYTES # BLD AUTO: 1.91 10*3/MM3 (ref 0.7–3.1)
LYMPHOCYTES NFR BLD AUTO: 28.9 % (ref 19.6–45.3)
MCH RBC QN AUTO: 27.1 PG (ref 26.6–33)
MCHC RBC AUTO-ENTMCNC: 32.5 G/DL (ref 31.5–35.7)
MCV RBC AUTO: 83.3 FL (ref 79–97)
MONOCYTES # BLD AUTO: 0.5 10*3/MM3 (ref 0.1–0.9)
MONOCYTES NFR BLD AUTO: 7.6 % (ref 5–12)
NEUTROPHILS NFR BLD AUTO: 4.03 10*3/MM3 (ref 1.7–7)
NEUTROPHILS NFR BLD AUTO: 60.8 % (ref 42.7–76)
PLATELET # BLD AUTO: 188 10*3/MM3 (ref 140–450)
PMV BLD AUTO: 10.7 FL (ref 6–12)
POTASSIUM SERPL-SCNC: 4.2 MMOL/L (ref 3.5–5.2)
PROT SERPL-MCNC: 6.9 G/DL (ref 6–8.5)
RBC # BLD AUTO: 6.17 10*6/MM3 (ref 4.14–5.8)
SODIUM SERPL-SCNC: 138 MMOL/L (ref 136–145)
WBC NRBC COR # BLD AUTO: 6.61 10*3/MM3 (ref 3.4–10.8)

## 2024-09-24 PROCEDURE — 96374 THER/PROPH/DIAG INJ IV PUSH: CPT

## 2024-09-24 PROCEDURE — 25010000002 ONDANSETRON PER 1 MG: Performed by: EMERGENCY MEDICINE

## 2024-09-24 PROCEDURE — 96361 HYDRATE IV INFUSION ADD-ON: CPT

## 2024-09-24 PROCEDURE — 96375 TX/PRO/DX INJ NEW DRUG ADDON: CPT

## 2024-09-24 PROCEDURE — 25810000003 SODIUM CHLORIDE 0.9 % SOLUTION: Performed by: EMERGENCY MEDICINE

## 2024-09-24 PROCEDURE — 99284 EMERGENCY DEPT VISIT MOD MDM: CPT | Performed by: EMERGENCY MEDICINE

## 2024-09-24 PROCEDURE — 25010000002 KETOROLAC TROMETHAMINE PER 15 MG: Performed by: EMERGENCY MEDICINE

## 2024-09-24 RX ORDER — ONDANSETRON 4 MG/1
4 TABLET, ORALLY DISINTEGRATING ORAL EVERY 6 HOURS PRN
Qty: 15 TABLET | Refills: 0 | Status: SHIPPED | OUTPATIENT
Start: 2024-09-24

## 2024-09-24 RX ORDER — DICYCLOMINE HCL 20 MG
20 TABLET ORAL EVERY 8 HOURS PRN
Qty: 30 TABLET | Refills: 0 | Status: SHIPPED | OUTPATIENT
Start: 2024-09-24

## 2024-09-24 RX ORDER — FAMOTIDINE 10 MG/ML
20 INJECTION, SOLUTION INTRAVENOUS ONCE
Status: COMPLETED | OUTPATIENT
Start: 2024-09-24 | End: 2024-09-24

## 2024-09-24 RX ORDER — KETOROLAC TROMETHAMINE 30 MG/ML
15 INJECTION, SOLUTION INTRAMUSCULAR; INTRAVENOUS ONCE
Status: COMPLETED | OUTPATIENT
Start: 2024-09-24 | End: 2024-09-24

## 2024-09-24 RX ORDER — ONDANSETRON 2 MG/ML
4 INJECTION INTRAMUSCULAR; INTRAVENOUS ONCE
Status: COMPLETED | OUTPATIENT
Start: 2024-09-24 | End: 2024-09-24

## 2024-09-24 RX ADMIN — FAMOTIDINE 20 MG: 10 INJECTION INTRAVENOUS at 00:24

## 2024-09-24 RX ADMIN — ONDANSETRON 4 MG: 2 INJECTION, SOLUTION INTRAMUSCULAR; INTRAVENOUS at 00:24

## 2024-09-24 RX ADMIN — KETOROLAC TROMETHAMINE 15 MG: 30 INJECTION, SOLUTION INTRAMUSCULAR at 00:29

## 2024-09-24 RX ADMIN — SODIUM CHLORIDE 500 ML: 9 INJECTION, SOLUTION INTRAVENOUS at 00:24

## 2024-09-24 NOTE — FSED PROVIDER NOTE
Subjective   History of Present Illness  39yom complains of gallbladder pain. Pt states he has known gallstone and has been told his gallbladder needs to be removed. He is unable to afford the surgery at this time and has been saving up for it. Yesterday he ate queso dip and his he had upper abdominal pain since that time. It was starting to ease up today but after dinner he started to have pain again. The patient had chicken and mashed potatoes for dinner.       Review of Systems   Constitutional: Negative.    Gastrointestinal:  Positive for abdominal pain and nausea.   All other systems reviewed and are negative.      Past Medical History:   Diagnosis Date    Cholelithiasis 23    Hypertension ?    On 2 medications for it.    Substance abuse 2005    Drug free since 2014       Allergies   Allergen Reactions    Penicillins Other (See Comments)     Family history       History reviewed. No pertinent surgical history.    History reviewed. No pertinent family history.    Social History     Socioeconomic History    Marital status:    Tobacco Use    Smoking status: Former     Current packs/day: 0.00     Average packs/day: 2.0 packs/day for 23.7 years (47.3 ttl pk-yrs)     Types: Cigarettes, Cigars     Start date: 1999     Quit date: 2022     Years since quittin.0     Passive exposure: Past    Smokeless tobacco: Never   Vaping Use    Vaping status: Never Used   Substance and Sexual Activity    Alcohol use: Not Currently    Drug use: Not Currently     Types: Cocaine(coke), Heroin, Hydrocodone, Morphine, Oxycodone     Comment: Drug free since 2014    Sexual activity: Yes     Partners: Female           Objective   Physical Exam  Vitals reviewed.   Constitutional:       General: He is not in acute distress.     Appearance: He is well-developed. He is not ill-appearing.   HENT:      Head: Normocephalic and atraumatic.      Mouth/Throat:      Mouth: Mucous membranes are moist.      Pharynx:  Oropharynx is clear.   Eyes:      Extraocular Movements: Extraocular movements intact.      Pupils: Pupils are equal, round, and reactive to light.   Cardiovascular:      Rate and Rhythm: Normal rate and regular rhythm.      Heart sounds: Normal heart sounds.   Pulmonary:      Effort: Pulmonary effort is normal.      Breath sounds: Normal breath sounds.   Abdominal:      General: Abdomen is flat.      Palpations: Abdomen is soft.      Tenderness: There is no abdominal tenderness.   Skin:     General: Skin is warm and dry.      Capillary Refill: Capillary refill takes less than 2 seconds.   Neurological:      Mental Status: He is alert.         Procedures           ED Course                                           Medical Decision Making  38y/o patient with RUQ abdominal pain, consistent with biliary colic. Abdominal exam without peritoneal signs. No evidence of acute abdomen at this time. Well appearing. Pt has h/o biliary colic and reports today's symptoms are similar.  Less likely to represent acute pancreatitis (neg lipase), PUD (including gastric perforation), acute infectious processes (pneumonia, hepatitis, pyelonephritis), atypical appendicitis, vascular catastrophe, bowel obstruction or viscus perforation, or acute coronary syndrome. Presentation not consistent with other acute, emergent causes of abdominal pain at this time. CBC CMP wnl. Abdominal exam benign, pt in no distress in the room. Discussed dietary changes and importance of follow-up.     Problems Addressed:  Biliary colic: complicated acute illness or injury    Amount and/or Complexity of Data Reviewed  Labs: ordered.    Risk  Prescription drug management.        Final diagnoses:   Biliary colic       ED Disposition  ED Disposition       ED Disposition   Discharge    Condition   Stable    Comment   --               Subhash Graves MD  [address] [place] [address] [place] [address] [phone]    Schedule an appointment as  soon as possible for a visit       [facility]  [address] [facility] [address] [phone]  Schedule an appointment as soon as possible for a visit            Where to Get Your Medications        These medications were sent to [id number] [place] [facility] [address] - [phone]  - [phone] FX  [address] [place] [address]      Phone: [phone]   dicyclomine 20 MG tablet  ondansetron ODT 4 MG disintegrating tablet          Medication List      No changes were made to your prescriptions during this visit.

## 2024-09-24 NOTE — DISCHARGE INSTRUCTIONS
Avoid all foods that contain fat (butter, milk, cheese etc). Clear liquid diet for the next 24 hours. Follow-up with your Doctor. Return if problems.

## 2024-12-09 ENCOUNTER — INPATIENT HOSPITAL (OUTPATIENT)
Dept: URBAN - METROPOLITAN AREA HOSPITAL 84 | Facility: HOSPITAL | Age: 39
End: 2024-12-09

## 2024-12-09 ENCOUNTER — HOSPITAL ENCOUNTER (INPATIENT)
Facility: HOSPITAL | Age: 39
LOS: 3 days | Discharge: HOME | End: 2024-12-12
Attending: EMERGENCY MEDICINE | Admitting: INTERNAL MEDICINE

## 2024-12-09 ENCOUNTER — APPOINTMENT (OUTPATIENT)
Dept: MRI IMAGING | Facility: HOSPITAL | Age: 39
End: 2024-12-09

## 2024-12-09 DIAGNOSIS — R74.8 ABNORMAL LEVELS OF OTHER SERUM ENZYMES: ICD-10-CM

## 2024-12-09 DIAGNOSIS — R10.13 EPIGASTRIC PAIN: Primary | ICD-10-CM

## 2024-12-09 DIAGNOSIS — R10.10 UPPER ABDOMINAL PAIN, UNSPECIFIED: ICD-10-CM

## 2024-12-09 DIAGNOSIS — K80.50 CHOLEDOCHOLITHIASIS: ICD-10-CM

## 2024-12-09 DIAGNOSIS — R79.89 ELEVATED LFTS: ICD-10-CM

## 2024-12-09 DIAGNOSIS — Z87.19 HISTORY OF CHOLELITHIASIS: ICD-10-CM

## 2024-12-09 DIAGNOSIS — R74.01 ELEVATION OF LEVELS OF LIVER TRANSAMINASE LEVELS: ICD-10-CM

## 2024-12-09 PROBLEM — R10.9 ABDOMINAL PAIN: Status: ACTIVE | Noted: 2024-12-09

## 2024-12-09 LAB
ALBUMIN SERPL-MCNC: 4.4 G/DL (ref 3.5–5.2)
ALBUMIN/GLOB SERPL: 1.6 G/DL
ALP SERPL-CCNC: 183 U/L (ref 39–117)
ALT SERPL W P-5'-P-CCNC: 636 U/L (ref 1–41)
ANION GAP SERPL CALCULATED.3IONS-SCNC: 9.8 MMOL/L (ref 5–15)
AST SERPL-CCNC: 329 U/L (ref 1–40)
BACTERIA UR QL AUTO: NORMAL /HPF
BASOPHILS # BLD AUTO: 0.04 10*3/MM3 (ref 0–0.2)
BASOPHILS NFR BLD AUTO: 0.8 % (ref 0–1.5)
BILIRUB SERPL-MCNC: 8.6 MG/DL (ref 0–1.2)
BILIRUB UR QL STRIP: ABNORMAL
BUN SERPL-MCNC: 12 MG/DL (ref 6–20)
BUN/CREAT SERPL: 9.6 (ref 7–25)
CALCIUM SPEC-SCNC: 9.7 MG/DL (ref 8.6–10.5)
CHLORIDE SERPL-SCNC: 95 MMOL/L (ref 98–107)
CLARITY UR: CLEAR
CO2 SERPL-SCNC: 30.2 MMOL/L (ref 22–29)
COLOR UR: ABNORMAL
CREAT SERPL-MCNC: 1.25 MG/DL (ref 0.76–1.27)
DEPRECATED RDW RBC AUTO: 40.5 FL (ref 37–54)
EGFRCR SERPLBLD CKD-EPI 2021: 76.1 ML/MIN/1.73
EOSINOPHIL # BLD AUTO: 0.05 10*3/MM3 (ref 0–0.4)
EOSINOPHIL NFR BLD AUTO: 1 % (ref 0.3–6.2)
ERYTHROCYTE [DISTWIDTH] IN BLOOD BY AUTOMATED COUNT: 13 % (ref 12.3–15.4)
GLOBULIN UR ELPH-MCNC: 2.7 GM/DL
GLUCOSE SERPL-MCNC: 98 MG/DL (ref 65–99)
GLUCOSE UR STRIP-MCNC: NEGATIVE MG/DL
HCT VFR BLD AUTO: 50.1 % (ref 37.5–51)
HGB BLD-MCNC: 16.1 G/DL (ref 13–17.7)
HGB UR QL STRIP.AUTO: NEGATIVE
HOLD SPECIMEN: NORMAL
HOLD SPECIMEN: NORMAL
HYALINE CASTS UR QL AUTO: NORMAL /LPF
IMM GRANULOCYTES # BLD AUTO: 0.02 10*3/MM3 (ref 0–0.05)
IMM GRANULOCYTES NFR BLD AUTO: 0.4 % (ref 0–0.5)
KETONES UR QL STRIP: NEGATIVE
LEUKOCYTE ESTERASE UR QL STRIP.AUTO: ABNORMAL
LIPASE SERPL-CCNC: 74 U/L (ref 13–60)
LYMPHOCYTES # BLD AUTO: 1.05 10*3/MM3 (ref 0.7–3.1)
LYMPHOCYTES NFR BLD AUTO: 20.2 % (ref 19.6–45.3)
MCH RBC QN AUTO: 27.2 PG (ref 26.6–33)
MCHC RBC AUTO-ENTMCNC: 32.1 G/DL (ref 31.5–35.7)
MCV RBC AUTO: 84.5 FL (ref 79–97)
MONOCYTES # BLD AUTO: 0.43 10*3/MM3 (ref 0.1–0.9)
MONOCYTES NFR BLD AUTO: 8.3 % (ref 5–12)
NEUTROPHILS NFR BLD AUTO: 3.61 10*3/MM3 (ref 1.7–7)
NEUTROPHILS NFR BLD AUTO: 69.3 % (ref 42.7–76)
NITRITE UR QL STRIP: NEGATIVE
NRBC BLD AUTO-RTO: 0 /100 WBC (ref 0–0.2)
PH UR STRIP.AUTO: 7.5 [PH] (ref 5–8)
PLATELET # BLD AUTO: 187 10*3/MM3 (ref 140–450)
PMV BLD AUTO: 11 FL (ref 6–12)
POTASSIUM SERPL-SCNC: 3.7 MMOL/L (ref 3.5–5.2)
PROT SERPL-MCNC: 7.1 G/DL (ref 6–8.5)
PROT UR QL STRIP: NEGATIVE
RBC # BLD AUTO: 5.93 10*6/MM3 (ref 4.14–5.8)
RBC # UR STRIP: NORMAL /HPF
REF LAB TEST METHOD: NORMAL
SODIUM SERPL-SCNC: 135 MMOL/L (ref 136–145)
SP GR UR STRIP: 1.01 (ref 1–1.03)
SQUAMOUS #/AREA URNS HPF: NORMAL /HPF
UROBILINOGEN UR QL STRIP: ABNORMAL
WBC # UR STRIP: NORMAL /HPF
WBC NRBC COR # BLD AUTO: 5.2 10*3/MM3 (ref 3.4–10.8)
WHOLE BLOOD HOLD COAG: NORMAL
WHOLE BLOOD HOLD SPECIMEN: NORMAL

## 2024-12-09 PROCEDURE — 85025 COMPLETE CBC W/AUTO DIFF WBC: CPT | Performed by: NURSE PRACTITIONER

## 2024-12-09 PROCEDURE — G0378 HOSPITAL OBSERVATION PER HR: HCPCS

## 2024-12-09 PROCEDURE — 25010000002 ONDANSETRON PER 1 MG: Performed by: NURSE PRACTITIONER

## 2024-12-09 PROCEDURE — 99222 1ST HOSP IP/OBS MODERATE 55: CPT | Performed by: NURSE PRACTITIONER

## 2024-12-09 PROCEDURE — 99285 EMERGENCY DEPT VISIT HI MDM: CPT

## 2024-12-09 PROCEDURE — 25810000003 LACTATED RINGERS PER 1000 ML: Performed by: NURSE PRACTITIONER

## 2024-12-09 PROCEDURE — 82105 ALPHA-FETOPROTEIN SERUM: CPT | Performed by: NURSE PRACTITIONER

## 2024-12-09 PROCEDURE — 25010000002 HYDROMORPHONE 1 MG/ML SOLUTION: Performed by: EMERGENCY MEDICINE

## 2024-12-09 PROCEDURE — 74181 MRI ABDOMEN W/O CONTRAST: CPT

## 2024-12-09 PROCEDURE — 86038 ANTINUCLEAR ANTIBODIES: CPT | Performed by: NURSE PRACTITIONER

## 2024-12-09 PROCEDURE — 25810000003 LACTATED RINGERS PER 1000 ML: Performed by: INTERNAL MEDICINE

## 2024-12-09 PROCEDURE — 83690 ASSAY OF LIPASE: CPT | Performed by: NURSE PRACTITIONER

## 2024-12-09 PROCEDURE — 81001 URINALYSIS AUTO W/SCOPE: CPT | Performed by: NURSE PRACTITIONER

## 2024-12-09 PROCEDURE — 25010000002 HYDROMORPHONE PER 4 MG: Performed by: NURSE PRACTITIONER

## 2024-12-09 PROCEDURE — 80053 COMPREHEN METABOLIC PANEL: CPT | Performed by: NURSE PRACTITIONER

## 2024-12-09 RX ORDER — ONDANSETRON 2 MG/ML
4 INJECTION INTRAMUSCULAR; INTRAVENOUS ONCE
Status: COMPLETED | OUTPATIENT
Start: 2024-12-09 | End: 2024-12-09

## 2024-12-09 RX ORDER — CLONAZEPAM 1 MG/1
1 TABLET ORAL DAILY
COMMUNITY

## 2024-12-09 RX ORDER — SODIUM CHLORIDE 0.9 % (FLUSH) 0.9 %
10 SYRINGE (ML) INJECTION EVERY 12 HOURS SCHEDULED
Status: DISCONTINUED | OUTPATIENT
Start: 2024-12-09 | End: 2024-12-12 | Stop reason: HOSPADM

## 2024-12-09 RX ORDER — ONDANSETRON 2 MG/ML
4 INJECTION INTRAMUSCULAR; INTRAVENOUS EVERY 6 HOURS PRN
Status: DISCONTINUED | OUTPATIENT
Start: 2024-12-09 | End: 2024-12-12 | Stop reason: HOSPADM

## 2024-12-09 RX ORDER — ONDANSETRON 4 MG/1
4 TABLET, ORALLY DISINTEGRATING ORAL EVERY 6 HOURS PRN
Status: DISCONTINUED | OUTPATIENT
Start: 2024-12-09 | End: 2024-12-12 | Stop reason: HOSPADM

## 2024-12-09 RX ORDER — POLYETHYLENE GLYCOL 3350 17 G/17G
17 POWDER, FOR SOLUTION ORAL DAILY PRN
Status: DISCONTINUED | OUTPATIENT
Start: 2024-12-09 | End: 2024-12-12 | Stop reason: HOSPADM

## 2024-12-09 RX ORDER — BISACODYL 5 MG/1
5 TABLET, DELAYED RELEASE ORAL DAILY PRN
Status: DISCONTINUED | OUTPATIENT
Start: 2024-12-09 | End: 2024-12-12 | Stop reason: HOSPADM

## 2024-12-09 RX ORDER — ACETAMINOPHEN 325 MG/1
650 TABLET ORAL EVERY 4 HOURS PRN
Status: DISCONTINUED | OUTPATIENT
Start: 2024-12-09 | End: 2024-12-09

## 2024-12-09 RX ORDER — ACETAMINOPHEN 160 MG/5ML
650 SOLUTION ORAL EVERY 4 HOURS PRN
Status: DISCONTINUED | OUTPATIENT
Start: 2024-12-09 | End: 2024-12-09

## 2024-12-09 RX ORDER — SODIUM CHLORIDE 9 MG/ML
40 INJECTION, SOLUTION INTRAVENOUS AS NEEDED
Status: DISCONTINUED | OUTPATIENT
Start: 2024-12-09 | End: 2024-12-12 | Stop reason: HOSPADM

## 2024-12-09 RX ORDER — SODIUM CHLORIDE 0.9 % (FLUSH) 0.9 %
10 SYRINGE (ML) INJECTION AS NEEDED
Status: DISCONTINUED | OUTPATIENT
Start: 2024-12-09 | End: 2024-12-12 | Stop reason: HOSPADM

## 2024-12-09 RX ORDER — BISACODYL 10 MG
10 SUPPOSITORY, RECTAL RECTAL DAILY PRN
Status: DISCONTINUED | OUTPATIENT
Start: 2024-12-09 | End: 2024-12-12 | Stop reason: HOSPADM

## 2024-12-09 RX ORDER — ACETAMINOPHEN 650 MG/1
650 SUPPOSITORY RECTAL EVERY 4 HOURS PRN
Status: DISCONTINUED | OUTPATIENT
Start: 2024-12-09 | End: 2024-12-09

## 2024-12-09 RX ORDER — ALUMINA, MAGNESIA, AND SIMETHICONE 2400; 2400; 240 MG/30ML; MG/30ML; MG/30ML
15 SUSPENSION ORAL EVERY 6 HOURS PRN
Status: DISCONTINUED | OUTPATIENT
Start: 2024-12-09 | End: 2024-12-12 | Stop reason: HOSPADM

## 2024-12-09 RX ORDER — HYDROMORPHONE HYDROCHLORIDE 1 MG/ML
0.5 INJECTION, SOLUTION INTRAMUSCULAR; INTRAVENOUS; SUBCUTANEOUS ONCE
Status: COMPLETED | OUTPATIENT
Start: 2024-12-09 | End: 2024-12-09

## 2024-12-09 RX ORDER — SODIUM CHLORIDE, SODIUM LACTATE, POTASSIUM CHLORIDE, CALCIUM CHLORIDE 600; 310; 30; 20 MG/100ML; MG/100ML; MG/100ML; MG/100ML
75 INJECTION, SOLUTION INTRAVENOUS CONTINUOUS
Status: DISCONTINUED | OUTPATIENT
Start: 2024-12-09 | End: 2024-12-12 | Stop reason: HOSPADM

## 2024-12-09 RX ORDER — IBUPROFEN 400 MG/1
400 TABLET, FILM COATED ORAL EVERY 6 HOURS PRN
Status: DISCONTINUED | OUTPATIENT
Start: 2024-12-09 | End: 2024-12-12 | Stop reason: HOSPADM

## 2024-12-09 RX ORDER — AMOXICILLIN 250 MG
2 CAPSULE ORAL 2 TIMES DAILY PRN
Status: DISCONTINUED | OUTPATIENT
Start: 2024-12-09 | End: 2024-12-12 | Stop reason: HOSPADM

## 2024-12-09 RX ADMIN — ONDANSETRON 4 MG: 2 INJECTION INTRAMUSCULAR; INTRAVENOUS at 15:27

## 2024-12-09 RX ADMIN — Medication 10 ML: at 20:30

## 2024-12-09 RX ADMIN — SODIUM CHLORIDE, SODIUM LACTATE, POTASSIUM CHLORIDE, CALCIUM CHLORIDE 75 ML/HR: 20; 30; 600; 310 INJECTION, SOLUTION INTRAVENOUS at 16:43

## 2024-12-09 RX ADMIN — HYDROMORPHONE HYDROCHLORIDE 0.5 MG: 1 INJECTION, SOLUTION INTRAMUSCULAR; INTRAVENOUS; SUBCUTANEOUS at 15:27

## 2024-12-09 RX ADMIN — HYDROMORPHONE HYDROCHLORIDE 1 MG: 1 INJECTION, SOLUTION INTRAMUSCULAR; INTRAVENOUS; SUBCUTANEOUS at 21:43

## 2024-12-09 NOTE — CONSULTS
GI CONSULT  NOTE:    Referring Provider:  Dr. Guzman    Chief complaint: Abdominal pain    Subjective .     History of present illness: Patient is a 39-year-old male with history of gallstones who presented to the hospital today with worsening in abdominal pain.  He was also at [facility] yesterday for the same and had CT that reportedly showed ileus.  Upon comparing his labs, liver enzymes have greatly increased.  In 2023 CT showed to gallstones and the cystic duct.  Ultrasound in May 2024 showed sludge within the gallbladder.  He had been evaluated for cholecystectomy but had not had this performed yet.  He reports that pain is located in the epigastric area and it can radiate to his back.  Reports some nausea but no vomiting.  He has not been taking Tylenol or new medications lately.  Denies rectal bleeding.      Endo History:  No record of previous endoscopy.    Past Medical History:  Past Medical History:   Diagnosis Date    Cholelithiasis 23    Hypertension ?    On 2 medications for it.    Substance abuse 2005    Drug free since 2014       Past Surgical History:  No past surgical history on file.    Social History:  Social History     Tobacco Use    Smoking status: Former     Current packs/day: 0.00     Average packs/day: 2.0 packs/day for 23.7 years (47.3 ttl pk-yrs)     Types: Cigarettes, Cigars     Start date: 1999     Quit date: 2022     Years since quittin.2     Passive exposure: Past    Smokeless tobacco: Never   Vaping Use    Vaping status: Never Used   Substance Use Topics    Alcohol use: Not Currently    Drug use: Not Currently     Types: Cocaine(coke), Heroin, Hydrocodone, Morphine, Oxycodone     Comment: Drug free since 2014       Family History:  No family history on file.    Medications:  (Not in a hospital admission)      Scheduled Meds:HYDROmorphone, 0.5 mg, Intravenous, Once  ondansetron, 4 mg, Intravenous, Once      Continuous Infusions:  "  PRN Meds:.    ALLERGIES:  Penicillins    ROS:  Review of Systems   Constitutional:  Negative for chills and fever.   Respiratory:  Negative for cough and shortness of breath.    Cardiovascular:  Negative for chest pain.   Gastrointestinal:  Positive for abdominal pain and nausea. Negative for blood in stool, constipation and vomiting.   Neurological:  Negative for dizziness and weakness.   Psychiatric/Behavioral:  Negative for agitation and confusion.        Objective     Vital Signs:   Visit Vitals  /75 (Patient Position: Lying)   Pulse 80   Temp 97.6 °F (36.4 °C) (Oral)   Resp 16   Ht 190.5 cm (75\")   Wt 103 kg (227 lb 8.2 oz)   SpO2 98%   BMI 28.44 kg/m²       Physical Exam:      General Appearance:    Awake and alert, in no acute distress   Head:    Normocephalic, without obvious abnormality, atraumatic   Eyes:            Conjunctivae normal, icteric sclera   Ears:    Ears appear intact with no abnormalities noted   Throat:   No oral lesions, no thrush, oral mucosa moist       Lungs:     Respirations regular, even and unlabored       Chest Wall:    No abnormalities observed   Abdomen:     Soft, epigastric tenderness, no rebound or guarding, non-distended, no hepatosplenomegaly   Rectal:     Deferred   Extremities:   Moves all extremities well, no edema, no cyanosis, no  redness   Pulses:   Pulses palpable and equal bilaterally   Skin:   No bleeding, bruising or rash, + jaundice       Neurologic:   Cranial nerves 2 - 12 grossly intact, no asterixis, sensation intact       Results Review:   I reviewed the patient's labs and imaging.  CBC  Results from last 7 days   Lab Units 12/09/24  1352   RBC 10*6/mm3 5.93*   WBC 10*3/mm3 5.20   HEMOGLOBIN g/dL 16.1   PLATELETS 10*3/mm3 187       CMP  Results from last 7 days   Lab Units 12/09/24  1352   SODIUM mmol/L 135*   POTASSIUM mmol/L 3.7   CHLORIDE mmol/L 95*   CO2 mmol/L 30.2*   BUN mg/dL 12   CREATININE mg/dL 1.25   GLUCOSE mg/dL 98   ALBUMIN g/dL 4.4 "   BILIRUBIN mg/dL 8.6*   ALK PHOS U/L 183*   AST (SGOT) U/L 329*   ALT (SGPT) U/L 636*       Amylase and Lipase  Results from last 7 days   Lab Units 12/09/24  1352   LIPASE U/L 74*       CRP         Imaging Results (Last 24 Hours)       ** No results found for the last 24 hours. **              ASSESSMENT AND PLAN:  Upper abdominal pain  Elevated liver enzymes  Mildly elevated lipase  History of cholelithiasis    Principal Problem:    Abdominal pain     Plan:  39-year-old male admitted 12/9/2024 with worsening abdominal pain and history of gallstones.  Found to have significant increase in liver enzymes.    Patient was at [facility] yesterday and had CT that showed ileus versus enteritis.  His liver enzymes have greatly increased since yesterday with total bilirubin up to 8.6, alk phos 183, , and .  Lipase is normal.  WBC is normal.  Patient reports worsening in the epigastric pain and it radiated to his back.  He has some nausea but no vomiting.  Denies rectal bleeding.  Concern for choledocholithiasis and we will proceed with MRCP today.  Monitor liver enzymes.  He will likely need cholecystectomy at some point.  Supportive care.    I discussed the patients findings and my recommendations with the patient.  Carlota Orozco, APRN  12/09/24  15:16 EST

## 2024-12-09 NOTE — ED NOTES
Spoke w/ Rosita in [facility] Medical Records.  She will be faxing over information for the patient's most recent visit (labs, imaging, and ER notes).

## 2024-12-09 NOTE — ED PROVIDER NOTES
Subjective   History of Present Illness  Patient is a 39-year-old male with past medical history of cholelithiasis and hypertension presenting for epigastric pain that has been getting progressively worse over the last 4 days.  Patient was seen at [place] yesterday and received a CT with contrast of the abdomen and ultrasound of the abdomen as well as labs.  Once discharged with diagnosis of elevated LFTs per his report.  Patient denies nausea, vomiting, diarrhea, fever, respiratory complaints, chest pain, leg swelling, dysuria.  He reports that his urine has been darker over the last day or so.  His PCP told him to come here today if his symptoms did not improve, and they have not.  He states that he was supposed to be getting his gallbladder removed but that he has not had insurance and has not been able to do that.  He takes half an oxycodone 10 at home as needed for his gallbladder pain when it flares up.  He had 1 of those this morning.      Review of Systems   Constitutional:  Negative for chills and fever.       Past Medical History:   Diagnosis Date    Cholelithiasis 23    Hypertension ?    On 2 medications for it.    Substance abuse 2005    Drug free since 2014       Allergies   Allergen Reactions    Penicillins Other (See Comments)     Family history       No past surgical history on file.    No family history on file.    Social History     Socioeconomic History    Marital status:    Tobacco Use    Smoking status: Former     Current packs/day: 0.00     Average packs/day: 2.0 packs/day for 23.7 years (47.3 ttl pk-yrs)     Types: Cigarettes, Cigars     Start date: 1999     Quit date: 2022     Years since quittin.2     Passive exposure: Past    Smokeless tobacco: Never   Vaping Use    Vaping status: Never Used   Substance and Sexual Activity    Alcohol use: Not Currently    Drug use: Not Currently     Types: Cocaine(coke), Heroin, Hydrocodone, Morphine, Oxycodone     Comment: Drug  free since 09/09/2014    Sexual activity: Yes     Partners: Female           Objective   Physical Exam  Constitutional:       General: He is not in acute distress.     Appearance: He is not ill-appearing, toxic-appearing or diaphoretic.   HENT:      Head: Normocephalic and atraumatic.      Mouth/Throat:      Mouth: Mucous membranes are moist.   Eyes:      Extraocular Movements: Extraocular movements intact.      Pupils: Pupils are equal, round, and reactive to light.   Cardiovascular:      Rate and Rhythm: Normal rate and regular rhythm.      Heart sounds: No murmur heard.     No friction rub. No gallop.   Pulmonary:      Effort: Pulmonary effort is normal. No respiratory distress.      Breath sounds: Normal breath sounds. No stridor. No wheezing, rhonchi or rales.   Abdominal:      General: Abdomen is flat. Bowel sounds are normal. There is no distension.      Palpations: Abdomen is soft.      Tenderness: There is abdominal tenderness in the right upper quadrant and epigastric area. There is no right CVA tenderness, left CVA tenderness, guarding or rebound. Positive signs include Hector's sign.   Skin:     General: Skin is warm and dry.      Capillary Refill: Capillary refill takes less than 2 seconds.      Coloration: Skin is not jaundiced or mottled.      Findings: No rash.   Neurological:      General: No focal deficit present.      Mental Status: He is alert.   Psychiatric:         Mood and Affect: Mood normal.         Behavior: Behavior normal.         Procedures           ED Course  ED Course as of 12/09/24 1526   Mon Dec 09, 2024   1451 Discussed patient with Carlota with GI.  She would like to schedule patient for MRCP. [MD]      ED Course User Index  [MD] Sharon Olmos APRN      Labs Reviewed   URINALYSIS W/ CULTURE IF INDICATED - Abnormal; Notable for the following components:       Result Value    Color, UA Dark Yellow (*)     Bilirubin, UA Moderate (2+) (*)     Leuk Esterase, UA Trace (*)     All other  components within normal limits    Narrative:     In absence of clinical symptoms, the presence of pyuria, bacteria, and/or nitrites on the urinalysis result does not correlate with infection.   COMPREHENSIVE METABOLIC PANEL - Abnormal; Notable for the following components:    Sodium 135 (*)     Chloride 95 (*)     CO2 30.2 (*)     ALT (SGPT) 636 (*)     AST (SGOT) 329 (*)     Alkaline Phosphatase 183 (*)     Total Bilirubin 8.6 (*)     All other components within normal limits    Narrative:     GFR Normal >60  Chronic Kidney Disease <60  Kidney Failure <15     LIPASE - Abnormal; Notable for the following components:    Lipase 74 (*)     All other components within normal limits   CBC WITH AUTO DIFFERENTIAL - Abnormal; Notable for the following components:    RBC 5.93 (*)     All other components within normal limits   RAINBOW DRAW    Narrative:     The following orders were created for panel order [place] Draw.  Procedure                               Abnormality         Status                     ---------                               -----------         ------                     Green Top (Gel)[[id number]]                                  Final result               Lavender Top[[id number]]                                     Final result               Gold Top - SST[[id number]]                                   Final result               Light Blue Top[[id number]]                                   Final result                 Please view results for these tests on the individual orders.   URINALYSIS, MICROSCOPIC ONLY   GREEN TOP   LAVENDER TOP   GOLD TOP - SST   LIGHT BLUE TOP   CBC AND DIFFERENTIAL    Narrative:     The following orders were created for panel order CBC & Differential.  Procedure                               Abnormality         Status                     ---------                               -----------         ------                     CBC Auto Differential[[id number]]        Abnormal             Final result                 Please view results for these tests on the individual orders.     No radiology results for the last day  Medications   HYDROmorphone (DILAUDID) injection 0.5 mg (has no administration in time range)   ondansetron (ZOFRAN) injection 4 mg (has no administration in time range)                                                      Medical Decision Making  Patient is a 39-year-old male who presented to the ER with epigastric pain after evaluation at [facility] yesterday.  Yesterday's ultrasound of the abdomen revealed no cholelithiasis or other abnormalities.  CT of the abdomen and pelvis showed possible ileus or enteritis.  There were no abnormalities of the pancreas, gallbladder, or liver.  Gallbladder ultrasound was also unremarkable. He does have a history of cholelithiasis and cholecystitis and was supposed to be getting his gallbladder removed but has not done that.  Imaging was not repeated secondary to him having a CT of the abdomen yesterday.  Labs: UA with 2+ bili and trace leuks.  CMP significant for 636 ALT, 329 AST, 183 alk phos, total bili 8.6.  These were increases from yesterday's labs.  Lipase today 74, which was increased from 61 yesterday.  CBC grossly unremarkable.   Discussed findings and new labs with GI, who recommended MRCP.  Patient's pain was managed with Dilaudid with Zofran.  He did not have any signs of peritonitis, vomiting or diarrhea during his visit today.  Discussed patient with Annette, and patient will be placed in observation.      Problems Addressed:  Elevated LFTs: complicated acute illness or injury  Epigastric pain: complicated acute illness or injury  History of cholelithiasis: complicated acute illness or injury    Amount and/or Complexity of Data Reviewed  Labs: ordered.    Risk  Prescription drug management.  Decision regarding hospitalization.        Final diagnoses:   Epigastric pain   Elevated LFTs   History of cholelithiasis       ED  Disposition  ED Disposition       ED Disposition   Decision to Admit    Condition   --    Comment   --               No follow-up provider specified.       Medication List      No changes were made to your prescriptions during this visit.            Sharon Olmos, APRN  12/09/24 1525

## 2024-12-10 ENCOUNTER — APPOINTMENT (OUTPATIENT)
Dept: GENERAL RADIOLOGY | Facility: HOSPITAL | Age: 39
End: 2024-12-10

## 2024-12-10 ENCOUNTER — ANESTHESIA EVENT (OUTPATIENT)
Dept: GASTROENTEROLOGY | Facility: HOSPITAL | Age: 39
End: 2024-12-10

## 2024-12-10 ENCOUNTER — ANESTHESIA (OUTPATIENT)
Dept: GASTROENTEROLOGY | Facility: HOSPITAL | Age: 39
End: 2024-12-10

## 2024-12-10 ENCOUNTER — INPATIENT HOSPITAL (OUTPATIENT)
Dept: URBAN - METROPOLITAN AREA HOSPITAL 84 | Facility: HOSPITAL | Age: 39
End: 2024-12-10

## 2024-12-10 DIAGNOSIS — K80.50 CALCULUS OF BILE DUCT WITHOUT CHOLANGITIS OR CHOLECYSTITIS W: ICD-10-CM

## 2024-12-10 LAB
ALBUMIN SERPL-MCNC: 4.3 G/DL (ref 3.5–5.2)
ALBUMIN/GLOB SERPL: 1.5 G/DL
ALP SERPL-CCNC: 212 U/L (ref 39–117)
ALPHA-FETOPROTEIN: <2 NG/ML (ref 0–8.3)
ALPHA1 GLOB MFR UR ELPH: 107 MG/DL (ref 90–200)
ALT SERPL W P-5'-P-CCNC: 636 U/L (ref 1–41)
ANION GAP SERPL CALCULATED.3IONS-SCNC: 14.3 MMOL/L (ref 5–15)
APAP SERPL-MCNC: <5 MCG/ML (ref 0–30)
AST SERPL-CCNC: 319 U/L (ref 1–40)
BASOPHILS # BLD AUTO: 0.05 10*3/MM3 (ref 0–0.2)
BASOPHILS NFR BLD AUTO: 0.9 % (ref 0–1.5)
BILIRUB SERPL-MCNC: 11 MG/DL (ref 0–1.2)
BUN SERPL-MCNC: 14 MG/DL (ref 6–20)
BUN/CREAT SERPL: 12.3 (ref 7–25)
CALCIUM SPEC-SCNC: 9.9 MG/DL (ref 8.6–10.5)
CERULOPLASMIN SERPL-MCNC: 25 MG/DL (ref 16–31)
CHLORIDE SERPL-SCNC: 95 MMOL/L (ref 98–107)
CO2 SERPL-SCNC: 24.7 MMOL/L (ref 22–29)
CREAT SERPL-MCNC: 1.14 MG/DL (ref 0.76–1.27)
DEPRECATED RDW RBC AUTO: 40.8 FL (ref 37–54)
EGFRCR SERPLBLD CKD-EPI 2021: 84.9 ML/MIN/1.73
EOSINOPHIL # BLD AUTO: 0.06 10*3/MM3 (ref 0–0.4)
EOSINOPHIL NFR BLD AUTO: 1.1 % (ref 0.3–6.2)
ERYTHROCYTE [DISTWIDTH] IN BLOOD BY AUTOMATED COUNT: 13.3 % (ref 12.3–15.4)
FERRITIN SERPL-MCNC: 693 NG/ML (ref 30–400)
GGT SERPL-CCNC: 120 U/L (ref 8–61)
GLOBULIN UR ELPH-MCNC: 2.8 GM/DL
GLUCOSE SERPL-MCNC: 86 MG/DL (ref 65–99)
HAV IGM SERPL QL IA: NORMAL
HBV CORE IGM SERPL QL IA: NORMAL
HBV SURFACE AG SERPL QL IA: NORMAL
HCT VFR BLD AUTO: 51 % (ref 37.5–51)
HCV AB SER QL: NORMAL
HGB BLD-MCNC: 16.9 G/DL (ref 13–17.7)
IMM GRANULOCYTES # BLD AUTO: 0.03 10*3/MM3 (ref 0–0.05)
IMM GRANULOCYTES NFR BLD AUTO: 0.6 % (ref 0–0.5)
IRON 24H UR-MRATE: 94 MCG/DL (ref 59–158)
LIPASE SERPL-CCNC: 48 U/L (ref 13–60)
LYMPHOCYTES # BLD AUTO: 1.08 10*3/MM3 (ref 0.7–3.1)
LYMPHOCYTES NFR BLD AUTO: 20.3 % (ref 19.6–45.3)
MCH RBC QN AUTO: 27.7 PG (ref 26.6–33)
MCHC RBC AUTO-ENTMCNC: 33.1 G/DL (ref 31.5–35.7)
MCV RBC AUTO: 83.5 FL (ref 79–97)
MONOCYTES # BLD AUTO: 0.48 10*3/MM3 (ref 0.1–0.9)
MONOCYTES NFR BLD AUTO: 9 % (ref 5–12)
NEUTROPHILS NFR BLD AUTO: 3.63 10*3/MM3 (ref 1.7–7)
NEUTROPHILS NFR BLD AUTO: 68.1 % (ref 42.7–76)
NRBC BLD AUTO-RTO: 0 /100 WBC (ref 0–0.2)
PLATELET # BLD AUTO: 207 10*3/MM3 (ref 140–450)
PMV BLD AUTO: 11.6 FL (ref 6–12)
POTASSIUM SERPL-SCNC: 4 MMOL/L (ref 3.5–5.2)
PROT SERPL-MCNC: 7.1 G/DL (ref 6–8.5)
RBC # BLD AUTO: 6.11 10*6/MM3 (ref 4.14–5.8)
SODIUM SERPL-SCNC: 134 MMOL/L (ref 136–145)
WBC NRBC COR # BLD AUTO: 5.33 10*3/MM3 (ref 3.4–10.8)

## 2024-12-10 PROCEDURE — 82390 ASSAY OF CERULOPLASMIN: CPT | Performed by: NURSE PRACTITIONER

## 2024-12-10 PROCEDURE — 25010000002 HYDROMORPHONE PER 4 MG: Performed by: INTERNAL MEDICINE

## 2024-12-10 PROCEDURE — 82103 ALPHA-1-ANTITRYPSIN TOTAL: CPT | Performed by: NURSE PRACTITIONER

## 2024-12-10 PROCEDURE — 74330 X-RAY BILE/PANC ENDOSCOPY: CPT

## 2024-12-10 PROCEDURE — 82977 ASSAY OF GGT: CPT | Performed by: NURSE PRACTITIONER

## 2024-12-10 PROCEDURE — 80053 COMPREHEN METABOLIC PANEL: CPT | Performed by: NURSE PRACTITIONER

## 2024-12-10 PROCEDURE — 86015 ACTIN ANTIBODY EACH: CPT | Performed by: NURSE PRACTITIONER

## 2024-12-10 PROCEDURE — 99222 1ST HOSP IP/OBS MODERATE 55: CPT | Performed by: SURGERY

## 2024-12-10 PROCEDURE — 25810000003 LACTATED RINGERS PER 1000 ML: Performed by: NURSE PRACTITIONER

## 2024-12-10 PROCEDURE — 25810000003 SODIUM CHLORIDE 0.9 % SOLUTION: Performed by: NURSE ANESTHETIST, CERTIFIED REGISTERED

## 2024-12-10 PROCEDURE — 25810000003 LACTATED RINGERS PER 1000 ML: Performed by: INTERNAL MEDICINE

## 2024-12-10 PROCEDURE — 63710000001 ONDANSETRON ODT 4 MG TABLET DISPERSIBLE: Performed by: NURSE PRACTITIONER

## 2024-12-10 PROCEDURE — 82728 ASSAY OF FERRITIN: CPT | Performed by: NURSE PRACTITIONER

## 2024-12-10 PROCEDURE — 0F798ZZ DILATION OF COMMON BILE DUCT, VIA NATURAL OR ARTIFICIAL OPENING ENDOSCOPIC: ICD-10-PCS | Performed by: INTERNAL MEDICINE

## 2024-12-10 PROCEDURE — 85025 COMPLETE CBC W/AUTO DIFF WBC: CPT | Performed by: NURSE PRACTITIONER

## 2024-12-10 PROCEDURE — 0FC98ZZ EXTIRPATION OF MATTER FROM COMMON BILE DUCT, VIA NATURAL OR ARTIFICIAL OPENING ENDOSCOPIC: ICD-10-PCS | Performed by: INTERNAL MEDICINE

## 2024-12-10 PROCEDURE — 43264 ERCP REMOVE DUCT CALCULI: CPT | Performed by: INTERNAL MEDICINE

## 2024-12-10 PROCEDURE — 43262 ENDO CHOLANGIOPANCREATOGRAPH: CPT | Performed by: INTERNAL MEDICINE

## 2024-12-10 PROCEDURE — 25010000002 ONDANSETRON PER 1 MG: Performed by: NURSE PRACTITIONER

## 2024-12-10 PROCEDURE — 25010000002 HYDROMORPHONE PER 4 MG: Performed by: SURGERY

## 2024-12-10 PROCEDURE — 25510000001 IOPAMIDOL 61 % SOLUTION 30 ML VIAL: Performed by: INTERNAL MEDICINE

## 2024-12-10 PROCEDURE — 25010000002 ONDANSETRON PER 1 MG: Performed by: NURSE ANESTHETIST, CERTIFIED REGISTERED

## 2024-12-10 PROCEDURE — 83690 ASSAY OF LIPASE: CPT | Performed by: NURSE PRACTITIONER

## 2024-12-10 PROCEDURE — 25010000002 PROPOFOL 200 MG/20ML EMULSION: Performed by: NURSE ANESTHETIST, CERTIFIED REGISTERED

## 2024-12-10 PROCEDURE — 25010000002 LIDOCAINE PF 2% 2 % SOLUTION: Performed by: NURSE ANESTHETIST, CERTIFIED REGISTERED

## 2024-12-10 PROCEDURE — 25010000002 FENTANYL CITRATE (PF) 100 MCG/2ML SOLUTION: Performed by: NURSE ANESTHETIST, CERTIFIED REGISTERED

## 2024-12-10 PROCEDURE — 83540 ASSAY OF IRON: CPT | Performed by: NURSE PRACTITIONER

## 2024-12-10 PROCEDURE — 25010000002 SUGAMMADEX 200 MG/2ML SOLUTION: Performed by: NURSE ANESTHETIST, CERTIFIED REGISTERED

## 2024-12-10 PROCEDURE — 25010000002 DEXAMETHASONE PER 1 MG: Performed by: NURSE ANESTHETIST, CERTIFIED REGISTERED

## 2024-12-10 PROCEDURE — 86381 MITOCHONDRIAL ANTIBODY EACH: CPT | Performed by: NURSE PRACTITIONER

## 2024-12-10 PROCEDURE — 80143 DRUG ASSAY ACETAMINOPHEN: CPT | Performed by: NURSE PRACTITIONER

## 2024-12-10 PROCEDURE — 25010000002 HYDROMORPHONE 1 MG/ML SOLUTION

## 2024-12-10 PROCEDURE — 86376 MICROSOMAL ANTIBODY EACH: CPT | Performed by: NURSE PRACTITIONER

## 2024-12-10 PROCEDURE — C1769 GUIDE WIRE: HCPCS | Performed by: INTERNAL MEDICINE

## 2024-12-10 PROCEDURE — 25010000002 HYDROMORPHONE PER 4 MG: Performed by: NURSE PRACTITIONER

## 2024-12-10 PROCEDURE — 63710000001 ONDANSETRON ODT 4 MG TABLET DISPERSIBLE: Performed by: SURGERY

## 2024-12-10 PROCEDURE — 80074 ACUTE HEPATITIS PANEL: CPT | Performed by: NURSE PRACTITIONER

## 2024-12-10 RX ORDER — PROMETHAZINE HYDROCHLORIDE 25 MG/1
25 SUPPOSITORY RECTAL ONCE AS NEEDED
Status: DISCONTINUED | OUTPATIENT
Start: 2024-12-10 | End: 2024-12-10 | Stop reason: HOSPADM

## 2024-12-10 RX ORDER — ROCURONIUM BROMIDE 10 MG/ML
INJECTION, SOLUTION INTRAVENOUS AS NEEDED
Status: DISCONTINUED | OUTPATIENT
Start: 2024-12-10 | End: 2024-12-10 | Stop reason: SURG

## 2024-12-10 RX ORDER — ONDANSETRON 2 MG/ML
INJECTION INTRAMUSCULAR; INTRAVENOUS AS NEEDED
Status: DISCONTINUED | OUTPATIENT
Start: 2024-12-10 | End: 2024-12-10 | Stop reason: SURG

## 2024-12-10 RX ORDER — EPHEDRINE SULFATE 5 MG/ML
5 INJECTION INTRAVENOUS ONCE AS NEEDED
Status: DISCONTINUED | OUTPATIENT
Start: 2024-12-10 | End: 2024-12-10 | Stop reason: HOSPADM

## 2024-12-10 RX ORDER — INDOCYANINE GREEN AND WATER 25 MG
2.5 KIT INJECTION ONCE
Status: COMPLETED | OUTPATIENT
Start: 2024-12-11 | End: 2024-12-11

## 2024-12-10 RX ORDER — HYDROCHLOROTHIAZIDE 25 MG/1
25 TABLET ORAL
Status: DISCONTINUED | OUTPATIENT
Start: 2024-12-10 | End: 2024-12-12 | Stop reason: HOSPADM

## 2024-12-10 RX ORDER — DEXAMETHASONE SODIUM PHOSPHATE 4 MG/ML
INJECTION, SOLUTION INTRA-ARTICULAR; INTRALESIONAL; INTRAMUSCULAR; INTRAVENOUS; SOFT TISSUE AS NEEDED
Status: DISCONTINUED | OUTPATIENT
Start: 2024-12-10 | End: 2024-12-10 | Stop reason: SURG

## 2024-12-10 RX ORDER — DIPHENHYDRAMINE HYDROCHLORIDE 50 MG/ML
12.5 INJECTION INTRAMUSCULAR; INTRAVENOUS ONCE AS NEEDED
Status: DISCONTINUED | OUTPATIENT
Start: 2024-12-10 | End: 2024-12-10 | Stop reason: HOSPADM

## 2024-12-10 RX ORDER — PROPOFOL 10 MG/ML
INJECTION, EMULSION INTRAVENOUS AS NEEDED
Status: DISCONTINUED | OUTPATIENT
Start: 2024-12-10 | End: 2024-12-10 | Stop reason: SURG

## 2024-12-10 RX ORDER — FENTANYL CITRATE 50 UG/ML
100 INJECTION, SOLUTION INTRAMUSCULAR; INTRAVENOUS
Status: DISCONTINUED | OUTPATIENT
Start: 2024-12-10 | End: 2024-12-10 | Stop reason: HOSPADM

## 2024-12-10 RX ORDER — FENTANYL CITRATE 50 UG/ML
INJECTION, SOLUTION INTRAMUSCULAR; INTRAVENOUS AS NEEDED
Status: DISCONTINUED | OUTPATIENT
Start: 2024-12-10 | End: 2024-12-10 | Stop reason: SURG

## 2024-12-10 RX ORDER — LISINOPRIL 20 MG/1
20 TABLET ORAL
Status: DISCONTINUED | OUTPATIENT
Start: 2024-12-10 | End: 2024-12-12 | Stop reason: HOSPADM

## 2024-12-10 RX ORDER — HYDRALAZINE HYDROCHLORIDE 20 MG/ML
5 INJECTION INTRAMUSCULAR; INTRAVENOUS
Status: DISCONTINUED | OUTPATIENT
Start: 2024-12-10 | End: 2024-12-10 | Stop reason: HOSPADM

## 2024-12-10 RX ORDER — CLONAZEPAM 1 MG/1
1 TABLET ORAL DAILY
Status: DISCONTINUED | OUTPATIENT
Start: 2024-12-10 | End: 2024-12-12 | Stop reason: HOSPADM

## 2024-12-10 RX ORDER — METOPROLOL SUCCINATE 25 MG/1
25 TABLET, EXTENDED RELEASE ORAL DAILY
Status: DISCONTINUED | OUTPATIENT
Start: 2024-12-10 | End: 2024-12-12 | Stop reason: HOSPADM

## 2024-12-10 RX ORDER — OXYCODONE HYDROCHLORIDE 5 MG/1
10 TABLET ORAL EVERY 4 HOURS PRN
Status: DISCONTINUED | OUTPATIENT
Start: 2024-12-10 | End: 2024-12-12 | Stop reason: HOSPADM

## 2024-12-10 RX ORDER — SODIUM CHLORIDE 9 MG/ML
INJECTION, SOLUTION INTRAVENOUS CONTINUOUS PRN
Status: DISCONTINUED | OUTPATIENT
Start: 2024-12-10 | End: 2024-12-10 | Stop reason: SURG

## 2024-12-10 RX ORDER — LIDOCAINE HYDROCHLORIDE 20 MG/ML
INJECTION, SOLUTION EPIDURAL; INFILTRATION; INTRACAUDAL; PERINEURAL AS NEEDED
Status: DISCONTINUED | OUTPATIENT
Start: 2024-12-10 | End: 2024-12-10 | Stop reason: SURG

## 2024-12-10 RX ORDER — INDOMETHACIN 100 MG
SUPPOSITORY, RECTAL RECTAL AS NEEDED
Status: DISCONTINUED | OUTPATIENT
Start: 2024-12-10 | End: 2024-12-10 | Stop reason: HOSPADM

## 2024-12-10 RX ORDER — HYDROMORPHONE HYDROCHLORIDE 1 MG/ML
0.5 INJECTION, SOLUTION INTRAMUSCULAR; INTRAVENOUS; SUBCUTANEOUS
Status: DISCONTINUED | OUTPATIENT
Start: 2024-12-10 | End: 2024-12-12

## 2024-12-10 RX ORDER — PROMETHAZINE HYDROCHLORIDE 25 MG/1
25 TABLET ORAL ONCE AS NEEDED
Status: DISCONTINUED | OUTPATIENT
Start: 2024-12-10 | End: 2024-12-10 | Stop reason: HOSPADM

## 2024-12-10 RX ORDER — PROCHLORPERAZINE EDISYLATE 5 MG/ML
10 INJECTION INTRAMUSCULAR; INTRAVENOUS ONCE AS NEEDED
Status: DISCONTINUED | OUTPATIENT
Start: 2024-12-10 | End: 2024-12-10 | Stop reason: HOSPADM

## 2024-12-10 RX ADMIN — SUGAMMADEX 200 MG: 100 INJECTION, SOLUTION INTRAVENOUS at 13:53

## 2024-12-10 RX ADMIN — Medication 10 ML: at 09:07

## 2024-12-10 RX ADMIN — ONDANSETRON 4 MG: 4 TABLET, ORALLY DISINTEGRATING ORAL at 09:17

## 2024-12-10 RX ADMIN — Medication 10 ML: at 21:19

## 2024-12-10 RX ADMIN — FENTANYL CITRATE 100 MCG: 50 INJECTION, SOLUTION INTRAMUSCULAR; INTRAVENOUS at 13:24

## 2024-12-10 RX ADMIN — SODIUM CHLORIDE, SODIUM LACTATE, POTASSIUM CHLORIDE, CALCIUM CHLORIDE 75 ML/HR: 20; 30; 600; 310 INJECTION, SOLUTION INTRAVENOUS at 19:12

## 2024-12-10 RX ADMIN — PROPOFOL 200 MG: 10 INJECTION, EMULSION INTRAVENOUS at 13:27

## 2024-12-10 RX ADMIN — LIDOCAINE HYDROCHLORIDE 100 MG: 20 INJECTION, SOLUTION EPIDURAL; INFILTRATION; INTRACAUDAL; PERINEURAL at 13:27

## 2024-12-10 RX ADMIN — SODIUM CHLORIDE: 9 INJECTION, SOLUTION INTRAVENOUS at 13:22

## 2024-12-10 RX ADMIN — ROCURONIUM BROMIDE 70 MG: 10 INJECTION, SOLUTION INTRAVENOUS at 13:27

## 2024-12-10 RX ADMIN — HYDROMORPHONE HYDROCHLORIDE 0.5 MG: 1 INJECTION, SOLUTION INTRAMUSCULAR; INTRAVENOUS; SUBCUTANEOUS at 22:24

## 2024-12-10 RX ADMIN — ONDANSETRON 4 MG: 2 INJECTION, SOLUTION INTRAMUSCULAR; INTRAVENOUS at 13:33

## 2024-12-10 RX ADMIN — OXYCODONE 10 MG: 5 TABLET ORAL at 21:20

## 2024-12-10 RX ADMIN — HYDROMORPHONE HYDROCHLORIDE 0.5 MG: 1 INJECTION, SOLUTION INTRAMUSCULAR; INTRAVENOUS; SUBCUTANEOUS at 19:08

## 2024-12-10 RX ADMIN — HYDROMORPHONE HYDROCHLORIDE 1 MG: 1 INJECTION, SOLUTION INTRAMUSCULAR; INTRAVENOUS; SUBCUTANEOUS at 03:59

## 2024-12-10 RX ADMIN — SODIUM CHLORIDE, SODIUM LACTATE, POTASSIUM CHLORIDE, CALCIUM CHLORIDE 75 ML/HR: 20; 30; 600; 310 INJECTION, SOLUTION INTRAVENOUS at 09:17

## 2024-12-10 RX ADMIN — HYDROMORPHONE HYDROCHLORIDE 0.5 MG: 1 INJECTION, SOLUTION INTRAMUSCULAR; INTRAVENOUS; SUBCUTANEOUS at 09:07

## 2024-12-10 RX ADMIN — DEXAMETHASONE SODIUM PHOSPHATE 8 MG: 4 INJECTION, SOLUTION INTRAMUSCULAR; INTRAVENOUS at 13:33

## 2024-12-10 NOTE — ANESTHESIA POSTPROCEDURE EVALUATION
Patient: Henrik Mojica    Procedure Summary       Date: 12/10/24 Room / Location: [facility] ENDOSCOPY 2 / [facility] ENDOSCOPY    Anesthesia Start: 1322 Anesthesia Stop: 1409    Procedure: ENDOSCOPIC RETROGRADE CHOLANGIOPANCREATOGRAPHY WITH SPHINCTEROTOMY, BALLOON CLEARANCE OF BILE DUCT, AND STONE EXTRACTION Diagnosis:       Choledocholithiasis      (Choledocholithiasis [K80.50])    Surgeons: Narinder Romeo MD Provider: Guero Zamudio MD    Anesthesia Type: general ASA Status: 2            Anesthesia Type: general    Vitals  Vitals Value Taken Time   /62 12/10/24 1413   Temp     Pulse 73 12/10/24 1417   Resp     SpO2 99 % 12/10/24 1417   Vitals shown include unfiled device data.        Post Anesthesia Care and Evaluation    Patient location during evaluation: PACU  Patient participation: complete - patient participated  Level of consciousness: awake  Pain scale: See nurse's notes for pain score.  Pain management: adequate    Airway patency: patent  Anesthetic complications: No anesthetic complications  PONV Status: none  Cardiovascular status: acceptable  Respiratory status: acceptable and spontaneous ventilation  Hydration status: acceptable    Comments: Patient seen and examined postoperatively; vital signs stable; SpO2 greater than or equal to 90%; cardiopulmonary status stable; nausea/vomiting adequately controlled; pain adequately controlled; no apparent anesthesia complications; patient discharged from anesthesia care when discharge criteria were met

## 2024-12-10 NOTE — NURSING NOTE
"Called report to Endo at ext. [phone]. Explained to them patient has been NPO since midnight last night however, he used swab sticks and took a disintegrating Zofran tablet. I was told \"that's fine.\"   "

## 2024-12-10 NOTE — CASE MANAGEMENT/SOCIAL WORK
Continued Stay Note  [facility]     Patient Name: Henrik Mojica  MRN: [id number]  Today's Date: 12/10/2024    Admit Date: 12/9/2024    Plan: Return home. Needs CM interview   Discharge Plan       Row Name 12/10/24 1333       Plan    Plan Return home. Needs CM interview    Plan Comments Barriers:   CM attempted interview but off the floor for ENDOSCOPIC RETROGRADE CHOLANGIOPANCREATOGRAPHY. He is listed as self pay but has an employer listed. He has PCP listed.  sent email to [employer] for them to talk to him about self pay.                          Isatu ESCOBEDO,RN Case Manager  [facility]  Phone: Desk- [phone] cell- [phone]

## 2024-12-10 NOTE — H&P
[facility]    Patient Name: Henrik Mojica  : 1985  MRN: [id number]  Primary Care Physician: Subhash Graves MD  Date of admission: 2024     Patient Care Team:  Subhash Graves MD as PCP - General (Family Medicine)  Lyndon Yan MD as Consulting Physician (General Surgery)          Subjective   History Present Illness     Chief Complaint:   Chief Complaint   Patient presents with    Abdominal Pain     Pt has abd pain, above belly button, pt was dx last night at [facility] with pancreatitis.  Pt nauseated.       Abdominal Pain     Abdominal Pain  Pertinent negatives include no dysuria, fever, nausea or vomiting.     Ed 2024  Patient is a 39-year-old male with past medical history of cholelithiasis and hypertension presenting for epigastric pain that has been getting progressively worse over the last 4 days.  Patient was seen at [facility] yesterday and received a CT with contrast of the abdomen and ultrasound of the abdomen as well as labs.  Once discharged with diagnosis of elevated LFTs per his report.  Patient denies nausea, vomiting, diarrhea, fever, respiratory complaints, chest pain, leg swelling, dysuria.  He reports that his urine has been darker over the last day or so.  His PCP told him to come here today if his symptoms did not improve, and they have not.  He states that he was supposed to be getting his gallbladder removed but that he has not had insurance and has not been able to do that.  He takes half an oxycodone 10 at home as needed for his gallbladder pain when it flares up.  He had 1 of those this morning.    Observation 12/10/2024  Patient agrees with HPI noted above clonidine epigastric pain for the past 5 days.  Has been following by GI and will have ERCP done today.  Liver enzymes significantly elevated.  Anticipate that patient will need overnight stay to trend labs.     Review of Systems   Constitutional: Negative for fever and malaise/fatigue.    Gastrointestinal:  Positive for abdominal pain. Negative for nausea and vomiting.   Genitourinary:  Negative for dysuria.           Personal History     Past Medical History:   Past Medical History:   Diagnosis Date    Cholelithiasis 12/9/23    Hypertension ?    On 2 medications for it.    Substance abuse 2005    Drug free since 09/09/2014       Surgical History:    History reviewed. No pertinent surgical history.        Family History: family history is not on file. Otherwise pertinent FHx was reviewed and unremarkable.     Social History:  reports that he quit smoking about 2 years ago. His smoking use included cigarettes and cigars. He started smoking about 25 years ago. He has a 47.3 pack-year smoking history. He has been exposed to tobacco smoke. He has never used smokeless tobacco. He reports that he does not currently use alcohol. He reports that he does not currently use drugs after having used the following drugs: Cocaine(coke), Heroin, Hydrocodone, Morphine, and Oxycodone.      Medications:  Prior to Admission medications    Medication Sig Start Date End Date Taking? Authorizing Provider   clonazePAM (KlonoPIN) 1 MG tablet Take 1 tablet by mouth Daily.   Yes Phillip Mayfield MD   lisinopril-hydrochlorothiazide (PRINZIDE,ZESTORETIC) 20-25 MG per tablet Take 1 tablet by mouth Daily. 10/8/23  Yes Phillip Mayfield MD   metoprolol succinate XL (TOPROL-XL) 25 MG 24 hr tablet Take 1 tablet by mouth Daily. 10/8/23  Yes Phillip Mayfield MD   Testosterone Cypionate (DEPOTESTOTERONE CYPIONATE) 200 MG/ML injection Inject 1 mL into the appropriate muscle as directed by prescriber Every 7 (Seven) Days. Sun 12/4/23  Yes Phillip Mayfield MD   Vitamin E 180 MG (400 UNIT) capsule capsule Take 1 capsule by mouth 2 (Two) Times a Day. 10/8/23  Yes Phillip Mayfield MD       Allergies:    Allergies   Allergen Reactions    Penicillins Other (See Comments)     Family history       Objective   Objective      Vital Signs  Temp:  [97.6 °F (36.4 °C)-98.3 °F (36.8 °C)] 97.9 °F (36.6 °C)  Heart Rate:  [56-83] 71  Resp:  [13-18] 13  BP: (121-142)/(75-85) 142/81  SpO2:  [97 %-99 %] 97 %  on  Flow (L/min) (Oxygen Therapy):  [2] 2;   Device (Oxygen Therapy): nasal cannula  Body mass index is 30.25 kg/m².    Physical Exam  Vitals and nursing note reviewed.   Constitutional:       Appearance: Normal appearance.   HENT:      Head: Normocephalic and atraumatic.      Right Ear: External ear normal.      Left Ear: External ear normal.      Nose: Nose normal.      Mouth/Throat:      Mouth: Mucous membranes are moist.   Eyes:      Extraocular Movements: Extraocular movements intact.   Cardiovascular:      Rate and Rhythm: Normal rate and regular rhythm.      Pulses: Normal pulses.      Heart sounds: Normal heart sounds.   Pulmonary:      Effort: Pulmonary effort is normal.      Breath sounds: Normal breath sounds.   Abdominal:      General: Abdomen is flat. Bowel sounds are normal.      Palpations: Abdomen is soft.   Musculoskeletal:         General: Normal range of motion.      Cervical back: Normal range of motion.   Skin:     General: Skin is warm.      Coloration: Skin is jaundiced.   Neurological:      Mental Status: He is alert and oriented to person, place, and time.   Psychiatric:         Behavior: Behavior normal.         Thought Content: Thought content normal.         Judgment: Judgment normal.           Results Review:  I have personally reviewed most recent lab results and radiology images and interpretations and agree with findings, most notably: CMP, iron, ferritin, CBC, hepatitis panel, UA, MRCP.    Results from last 7 days   Lab Units 12/10/24  0410   WBC 10*3/mm3 5.33   HEMOGLOBIN g/dL 16.9   HEMATOCRIT % 51.0   PLATELETS 10*3/mm3 207     Results from last 7 days   Lab Units 12/10/24  0410   SODIUM mmol/L 134*   POTASSIUM mmol/L 4.0   CHLORIDE mmol/L 95*   CO2 mmol/L 24.7   BUN mg/dL 14   CREATININE mg/dL 1.14    GLUCOSE mg/dL 86   CALCIUM mg/dL 9.9   ALK PHOS U/L 212*   ALT (SGPT) U/L 636*   AST (SGOT) U/L 319*     Estimated Creatinine Clearance: 118.8 mL/min (by C-G formula based on SCr of 1.14 mg/dL).  Brief Urine Lab Results  (Last result in the past 365 days)        Color   Clarity   Blood   Leuk Est   Nitrite   Protein   CREAT   Urine HCG        12/09/24 1418 Dark Yellow  Comment: Result checked     Clear   Negative   Trace   Negative   Negative                   Microbiology Results (last 10 days)       ** No results found for the last 240 hours. **            ECG/EMG Results (most recent)       Procedure Component Value Units Date/Time    Telemetry Scan [[id number]] Resulted: 12/09/24     Updated: 12/10/24 0455                    MRI abdomen wo contrast mrcp    Result Date: 12/10/2024  Impression: 1. Dilated biliary tree with small rounded filling defects in the common hepatic and common bile ducts suspicious for choledocholithiasis. Consider ERCP. 2. No evidence of acute cholecystitis or gallstone pancreatitis. 3. Incidental pancreatic divisum. Electronically Signed: Agus Grande MD  12/10/2024 7:44 AM EST  Workstation ID: [id number]       Estimated Creatinine Clearance: 118.8 mL/min (by C-G formula based on SCr of 1.14 mg/dL).    Assessment & Plan   Assessment/Plan       Active Hospital Problems    Diagnosis  POA    **Abdominal pain [R10.9]  Yes    Choledocholithiasis [K80.50]  Unknown      Resolved Hospital Problems   No resolved problems to display.       Abdominal pain/cholelithiasis  Lab Results   Component Value Date    WBC 5.33 12/10/2024     (H) 12/10/2024     (H) 12/10/2024    ALKPHOS 212 (H) 12/10/2024    BILITOT 11.0 (H) 12/10/2024    LIPASE 48 12/10/2024   -CMP showed significantly elevated liver enzymes on admission and still elevated today  -12/10-    -CBC generally unremarkable  -Hepatitis panel negative  -Acetaminophen less than 5  -GI was consulted by ED  provider  -12/9-MRCP showed dilated bile urinary tree with small rounded filling defect in the, hepatic and common bile ducts suspicious for choledocholithiasis.  ERCP recommended  -Multiple labs ordered by GI- Iron 94, ferritin 693, Alpha-1 antitrypsin, cerulopasmin and lipase within normal range  -Hepatitis A, B and C nonreactive  -Recheck CMP in a.m.  -Per UR he meets IP criteria if staying overnight, will consult hospitalist  -12/10-ERCP done with successful sphincterotomy and 2 green pigmented stones removed   -Per GI, clear liquid and general surgeon consult for possible cholecystectomy   -Will make NPO after midnight pending surgeon's eval       Hypertension  -Moderately controlled   BP Readings from Last 1 Encounters:   12/10/24 142/81   - Continue lisinopril, HCTZ, metoprolol  - Monitor while admitted       Anxiety  -Continue Klonopin    Obesity  -BMI 30.25  -Lifestyle modifications        VTE Prophylaxis - Active VTE Prophylaxis  Mechanical:        Start        12/09/24 1633  Maintain Sequential Compression Device  Continuous                          Select Pharmacologic VTE Prophylaxis if Desired & Appropriate      CODE STATUS:    Code Status and Medical Interventions: CPR (Attempt to Resuscitate); Full Support   Ordered at: 12/09/24 1521     Level Of Support Discussed With:    Patient     Code Status (Patient has no pulse and is not breathing):    CPR (Attempt to Resuscitate)     Medical Interventions (Patient has pulse or is breathing):    Full Support       This patient has been examined wearing personal protective equipment.     I discussed the patient's findings and my recommendations with patient and nursing staff.      Signature:Electronically signed by JOSE MANUEL Lloyd, 12/10/24, 11:57 AM EST.

## 2024-12-10 NOTE — ANESTHESIA PROCEDURE NOTES
Airway  Urgency: elective    Date/Time: 12/10/2024 1:29 PM  Airway not difficult    General Information and Staff    Anesthesiologist: Guero Zamudio MD  CRNA/CAA: Jones Short CRNA    Indications and Patient Condition  Indications for airway management: airway protection    Preoxygenated: yes  MILS not maintained throughout  Mask difficulty assessment: 2 - vent by mask + OA or adjuvant +/- NMBA    Final Airway Details  Final airway type: endotracheal airway      Successful airway: ETT  Cuffed: yes   Successful intubation technique: video laryngoscopy  Endotracheal tube insertion site: oral  Blade: Tracy  Blade size: 3  ETT size (mm): 7.5  Cormack-Lehane Classification: grade I - full view of glottis  Placement verified by: chest auscultation and capnometry   Measured from: gums  ETT/EBT to gums (cm): 24  Number of attempts at approach: 1  Assessment: lips, teeth, and gum same as pre-op and atraumatic intubation

## 2024-12-10 NOTE — H&P
[facility] Medicine Services  History & Physical    Patient Name: Henrik Mojica  : 1985  MRN: [id number]  Primary Care Physician:  Subhash Graves MD  Date of admission: 2024  Date and Time of Service: 2024 at 5:54 PM EST      Subjective      Chief Complaint: Abdominal pain    History of Present Illness: Henrik Mojica is a 39 y.o. male with a CMH of cholelithiasis, hypertension who presented to [facility] on 2024 with abdominal pain x 4 days..Patient was seen at [facility] yesterday and received a CT with contrast of the abdomen and ultrasound of the abdomen as well as labs. Once discharged with diagnosis of elevated LFTs per his report. Patient denies nausea, vomiting, diarrhea, fever, respiratory complaints, chest pain, leg swelling, dysuria. He reports that his urine has been darker over the last day or so. His PCP told him to come here today if his symptoms did not improve, and they have not. He states that he was supposed to be getting his gallbladder removed but that he has not had insurance and has not been able to do that. He takes half an oxycodone 10 at home as needed for his gallbladder pain when it flares up. He had 1 prior to arrival.  Patient was evaluated and placed in observation unit for GI to evaluate.  ERCP was completed which removed 2 common bile duct stones.  Surgery was consulted for evaluation of cholecystectomy    Due to patient's length of stay, patient meets inpatient criteria, hospital management was consulted.      Review of Systems  Review of Systems   Constitutional: Negative for fever and malaise/fatigue.   Gastrointestinal:  Positive for abdominal pain. Negative for nausea and vomiting.   Genitourinary:  Negative for dysuria.      Personal History     Past Medical History:   Diagnosis Date    Cholelithiasis 23    Hypertension ?    On 2 medications for it.    Substance abuse 2005    Drug free since 2014       History  reviewed. No pertinent surgical history.    Family History: family history is not on file. Otherwise pertinent FHx was reviewed and not pertinent to current issue.    Social History:  reports that he quit smoking about 2 years ago. His smoking use included cigarettes and cigars. He started smoking about 25 years ago. He has a 47.3 pack-year smoking history. He has been exposed to tobacco smoke. He has never used smokeless tobacco. He reports that he does not currently use alcohol. He reports that he does not currently use drugs after having used the following drugs: Cocaine(coke), Heroin, Hydrocodone, Morphine, and Oxycodone.    Home Medications:  Prior to Admission Medications       Prescriptions Last Dose Informant Patient Reported? Taking?    clonazePAM (KlonoPIN) 1 MG tablet   Yes Yes    Take 1 tablet by mouth Daily.    lisinopril-hydrochlorothiazide (PRINZIDE,ZESTORETIC) 20-25 MG per tablet   Yes Yes    Take 1 tablet by mouth Daily.    metoprolol succinate XL (TOPROL-XL) 25 MG 24 hr tablet   Yes Yes    Take 1 tablet by mouth Daily.    Testosterone Cypionate (DEPOTESTOTERONE CYPIONATE) 200 MG/ML injection Past Week  Yes Yes    Inject 1 mL into the appropriate muscle as directed by prescriber Every 7 (Seven) Days. Sun    Vitamin E 180 MG (400 UNIT) capsule capsule   Yes Yes    Take 1 capsule by mouth 2 (Two) Times a Day.              Allergies:  Allergies   Allergen Reactions    Penicillins Other (See Comments)     Family history       Objective      Vitals:   Temp:  [97.7 °F (36.5 °C)-98.4 °F (36.9 °C)] 98 °F (36.7 °C)  Heart Rate:  [] 100  Resp:  [13-18] 17  BP: (105-144)/(55-87) 123/78  FiO2 (%):  [50 %] 50 %  Body mass index is 30.25 kg/m².  Physical Exam  PHYSICAL EXAM  Constitutional:  Well-developed, well-nourished, no acute distress, nontoxic appearance   Eyes:  PERRL, conjunctivae normal, EOMI   HENT:  Atraumatic, external ears normal, nose normal, oropharynx moist, no pharyngeal exudates.  Neck-normal range of motion, no tenderness, supple, trachea midline  Respiratory:  ctab, nonlabored respirations without accessory muscle use  Cardiovascular:  Normal rate, normal rhythm, no murmurs, no gallops, no rubs   GI:  Soft, nondistended, normal bowel sounds, nontender, no organomegaly, no mass, no rebound, no guarding   :  No costovertebral angle tenderness   Musculoskeletal:  No edema, no tenderness, no deformities  Integument:  Well hydrated, no rash   Lymphatic:  No lymphadenopathy noted   Neurologic:  Alert & oriented x 3, CN 2-12 normal, normal motor function, normal sensory function, no focal deficits noted   Psychiatric:  Speech and behavior appropriate      Diagnostic Data:  Lab Results (last 24 hours)       Procedure Component Value Units Date/Time    AFP Tumor Marker [[id number]]  (Normal) Collected: 12/09/24 1352    Specimen: Blood from Arm, Right Updated: 12/10/24 1056     ALPHA-FETOPROTEIN <2 ng/mL     Narrative:      Alpha Fetoprotein Tumor Marker Reference Range:    0.0-8.3 ng/mL    Note: Normal values apply only to males and nonpregnant females. These results are not interpretable for pregnant females.    Testing Method: [employer] Electrochemiluminescence Immunoassay(ECLIA)  Values obtained with different assay methods or kits cannot be used interchangeably.    Alpha - 1 - Antitrypsin [[id number]]  (Normal) Collected: 12/10/24 0410    Specimen: Blood from Arm, Left Updated: 12/10/24 1054     ALPHA -1 ANTITRYPSIN 107 mg/dL     Ceruloplasmin [[id number]]  (Normal) Collected: 12/10/24 0410    Specimen: Blood from Arm, Left Updated: 12/10/24 1054     Ceruloplasmin 25 mg/dL     Gamma GT [[id number]]  (Abnormal) Collected: 12/10/24 0410    Specimen: Blood from Arm, Left Updated: 12/10/24 1054      U/L     Hepatitis Panel, Acute [[id number]]  (Normal) Collected: 12/10/24 0822    Specimen: Blood from Arm, Right Updated: 12/10/24 0919     Hepatitis B Surface Ag Non-Reactive     Hep A IgM  Non-Reactive     Hep B C IgM Non-Reactive     Hepatitis C Ab Non-Reactive    Narrative:      Results may be falsely decreased if patient taking Biotin.     Anti-microsomal Antibody [[id number]] Collected: 12/10/24 0822    Specimen: Blood from Arm, Right Updated: 12/10/24 0844    Anti-Smooth Muscle Antibody Titer [[id number]] Collected: 12/10/24 0822    Specimen: Blood from Arm, Right Updated: 12/10/24 0844    Mitochondrial Antibodies, M2 [[id number]] Collected: 12/10/24 0822    Specimen: Blood from Arm, Right Updated: 12/10/24 0844    Acetaminophen Level [[id number]]  (Normal) Collected: 12/10/24 0410    Specimen: Blood from Arm, Left Updated: 12/10/24 0802     Acetaminophen <5.0 mcg/mL     Narrative:      Acetaminophen Therapeutic Range  5-20 ug/mL      Hours after ingestion            Toxic Value    4 Hours                           150 ug/mL    8 Hours                            70 ug/mL   12 Hours                            40 ug/mL   16 Hours                            20 ug/mL    These values apply to a single ingestion only.     Ferritin [[id number]]  (Abnormal) Collected: 12/10/24 0410    Specimen: Blood from Arm, Left Updated: 12/10/24 0802     Ferritin 693.00 ng/mL     Narrative:      Results may be falsely decreased if patient taking Biotin.      Iron [[id number]]  (Normal) Collected: 12/10/24 0410    Specimen: Blood from Arm, Left Updated: 12/10/24 0802     Iron 94 mcg/dL     GINO [[id number]] Collected: 12/09/24 1352    Specimen: Blood from Arm, Right Updated: 12/10/24 0728    Comprehensive Metabolic Panel [[id number]]  (Abnormal) Collected: 12/10/24 0410    Specimen: Blood from Arm, Left Updated: 12/10/24 0532     Glucose 86 mg/dL      BUN 14 mg/dL      Creatinine 1.14 mg/dL      Sodium 134 mmol/L      Potassium 4.0 mmol/L      Chloride 95 mmol/L      CO2 24.7 mmol/L      Calcium 9.9 mg/dL      Total Protein 7.1 g/dL      Albumin 4.3 g/dL      ALT (SGPT) 636 U/L      AST (SGOT) 319 U/L      Alkaline Phosphatase  212 U/L      Total Bilirubin 11.0 mg/dL      Globulin 2.8 gm/dL      A/G Ratio 1.5 g/dL      BUN/Creatinine Ratio 12.3     Anion Gap 14.3 mmol/L      eGFR 84.9 mL/min/1.73     Narrative:      GFR Normal >60  Chronic Kidney Disease <60  Kidney Failure <15      Lipase [[id number]]  (Normal) Collected: 12/10/24 0410    Specimen: Blood from Arm, Left Updated: 12/10/24 0525     Lipase 48 U/L     CBC & Differential [[id number]]  (Abnormal) Collected: 12/10/24 0410    Specimen: Blood from Arm, Left Updated: 12/10/24 0502    Narrative:      The following orders were created for panel order CBC & Differential.  Procedure                               Abnormality         Status                     ---------                               -----------         ------                     CBC Auto Differential[[id number]]        Abnormal            Final result                 Please view results for these tests on the individual orders.    CBC Auto Differential [[id number]]  (Abnormal) Collected: 12/10/24 0410    Specimen: Blood from Arm, Left Updated: 12/10/24 0502     WBC 5.33 10*3/mm3      RBC 6.11 10*6/mm3      Hemoglobin 16.9 g/dL      Hematocrit 51.0 %      MCV 83.5 fL      MCH 27.7 pg      MCHC 33.1 g/dL      RDW 13.3 %      RDW-SD 40.8 fl      MPV 11.6 fL      Platelets 207 10*3/mm3      Neutrophil % 68.1 %      Lymphocyte % 20.3 %      Monocyte % 9.0 %      Eosinophil % 1.1 %      Basophil % 0.9 %      Immature Grans % 0.6 %      Neutrophils, Absolute 3.63 10*3/mm3      Lymphocytes, Absolute 1.08 10*3/mm3      Monocytes, Absolute 0.48 10*3/mm3      Eosinophils, Absolute 0.06 10*3/mm3      Basophils, Absolute 0.05 10*3/mm3      Immature Grans, Absolute 0.03 10*3/mm3      nRBC 0.0 /100 WBC              Imaging Results (Last 24 Hours)       Procedure Component Value Units Date/Time    FL ERCP pancreatic and biliary ducts [[id number]] Collected: 12/10/24 1404     Updated: 12/10/24 1408    Narrative:      FL ERCP PANCREATIC AND  BILIARY DUCTS    Date of Exam: 12/10/2024 1:30 PM EST    Indication: ENDOSCOPIC RETROGRADE CHOLANGIOPANCREATOGRAPHY WITH SPHINCTEROTOMY, BALLOON CLEARANCE OF BILE DUCT, AND STONE EXTRACTION in ENDO 2.     Comparison:  MRI abdomen with MRCP 12/9/2024. Gallbladder ultrasound 5/27/2024. CT abdomen pelvis 12/10/2023.    Technique:  A series of radiographic digital spot films were obtained in conjunction with a endoscopic catheterization of the biliary and pancreatic ductal system, performed by the gastroenterologist.    Fluoroscopic Time: 2.4 minutes    Number of Images: 4 spot fluoroscopic series images    Findings:  ERCP was performed utilizing fluoroscopic guidance. Contrast was injected retrograde into the common bile duct. Rounded filling defect in the lower CBD. Upstream common bile duct demonstrates mild diffuse fusiform dilation. No significant intrahepatic   biliary dilation is seen.      Impression:      Impression:  ERCP with fluoroscopy. Please refer to the procedure report for findings and recommendations.      Electronically Signed: Jolene Shea MD    12/10/2024 2:06 PM EST    Workstation ID: [id number]    MRI abdomen wo contrast mrcp [[id number]] Collected: 12/10/24 0735     Updated: 12/10/24 0746    Narrative:      MRI ABDOMEN WO CONTRAST MRCP    Date of Exam: 12/9/2024 6:15 PM EST    Indication: cholelithiasis, elevated LFTs.     Comparison: Gallbladder ultrasound 5/27/2024, CT abdomen pelvis 12/10/2023    Technique:  Routine multiplanar/multisequence images of the abdomen were obtained with MRCP sequences without contrast administration.      Findings:  Mild diffuse dilation of the biliary tree. Common bile duct measures up to 1 cm downstream and 8 mm upstream. Small rounded filling defects at the common hepatic duct example image 7 series 14, downstream common bile duct image 5 series 14 and   questionably at the ampulla image 6 series 14 suspicious for choledocholithiasis.    Heart size normal. No  pericardial effusion. Normal size and contour of liver and spleen without significant metal or fat deposition. No visible stones in the gallbladder. Negative for gallbladder distention, wall thickening or pericholecystic fluid.   There is complete pancreatic divisum image 26 series 5. No main duct dilation, active inflammation or drainable collection. Spleen is homogeneous.    No suspicious adrenal nodule. Symmetric renal size and contour. No hydronephrosis. Expected configuration stomach and duodenum. No suspicious adenopathy. No ascites or drainable fluid collection. No acute abdominal wall findings. No infiltrative marrow   process.      Impression:      Impression:  1. Dilated biliary tree with small rounded filling defects in the common hepatic and common bile ducts suspicious for choledocholithiasis. Consider ERCP.  2. No evidence of acute cholecystitis or gallstone pancreatitis.  3. Incidental pancreatic divisum.    Electronically Signed: Agus Grande MD    12/10/2024 7:44 AM EST    Workstation ID: [id number]              Assessment & Plan        This is a 39 y.o. male with:    Active and Resolved Problems  Active Hospital Problems    Diagnosis  POA    **Abdominal pain [R10.9]  Yes    Choledocholithiasis [K80.50]  Unknown      Resolved Hospital Problems   No resolved problems to display.       Abdominal pain  -AST pre-ERCP 329, post ERCP 319  -ALT pre-ERCP 636, remained stable  -Total bili pre-ERCP 8.6, post 11.0  -Lipase improved to 74--> 48  -Hepatitis panel negative  -12 9 MRCP showed dilated bowel urinary tree with small rounded filling defect in the hepatic and common bile duct suspicious for choledocholithiasis  -ERCP performed on 12/10/2024  -Patient tolerated clear liquid diet.  -Surgery consulted, plan cholecystectomy on 12/11/2024  -N.p.o. after midnight    Hypertension  -Controlled, blood pressure 123/78  -Continue lisinopril, HCTZ, metoprolol  -Continue telemetry monitoring    Anxiety  -Continue  Klonopin      VTE Prophylaxis:  Mechanical VTE prophylaxis orders are signed & held. Mechanical VTE prophylaxis orders are present.        The patient desires to be as follows:    CODE STATUS:    Level Of Support Discussed With: Patient  Code Status (Patient has no pulse and is not breathing): CPR (Attempt to Resuscitate)  Medical Interventions (Patient has pulse or is breathing): Full Support        Bessy Mojica, who can be contacted at [phone], is the designated person to make medical decisions on the patient's behalf if He is incapable of doing so. This was clarified with patient and/or next of kin on 12/9/2024 during the course of this H&P.    Admission Status:  I believe this patient meets inpatient status.    Expected Length of Stay: 2    PDMP and Medication Dispenses via Sidebar reviewed and consistent with patient reported medications.    I discussed the patient's findings and my recommendations with patient.      Signature:     This document has been electronically signed by JOSE MANUEL Goddard on December 10, 2024 18:53 EST   [facility] Hospitalist Team

## 2024-12-10 NOTE — OP NOTE
ENDOSCOPIC RETROGRADE CHOLANGIOPANCREATOGRAPHY Procedure Report    Patient Name:  Henrik Mojica  YOB: 1985    Date of Surgery:  12/10/2024     Pre-Op Diagnosis:  Choledocholithiasis [K80.50]       Post-Op Diagnosis Codes:     * Choledocholithiasis [K80.50]  Choledocholithiasis status post successful sphincterotomy and 2 green pigmented stones removed    Procedure/CPT® Codes:      Procedure(s):  ENDOSCOPIC RETROGRADE CHOLANGIOPANCREATOGRAPHY WITH SPHINCTEROTOMY, BALLOON CLEARANCE OF BILE DUCT, AND STONE EXTRACTION    Staff:  Surgeon(s):  Narinder Romeo MD      Anesthesia: General    Description of Procedure:  A description of the procedure as well as risks, benefits and alternative methods were explained to the patient who voiced understanding and signed the corresponding consent form.Specifically risks of post-ERCP pancreatitis, bleeding, perforation, failure to canulate and adverse reaction to sedation were discussed. A physical exam was performed and vital signs were monitored throughout the procedure.    A  film was performed which was normal. With the patient in the semi-prone position, an Olympus side viewing endoscope was placed into the mouth and proceeded through the esophagus, stomach and second portion of the duodenum without difficulty. Limited views of the esophagus and stomach were normal. The ampulla was visualized and appeared normal. A [employer] hydrotome was used to cannulate the ampulla using wire guided technique. Bile was aspirated to ensure this was the duct of interest. Contrast was injected into the bile duct.  The common bile duct measured about 10 mm with several filling defects seen.  The intrahepatic ducts appeared normal.  A sphincterotomy was performed in the 1 o'clock position followed by multiple balloon sweeps with at least 2 green pigmented stones removed measuring about 5 and 6 mm.    The scope was then retroflexed and the fundus was visualized. The  procedure was not difficult and there were no immediate complications.    Specimen:        See Below    Estimated blood loss: none    Complications:  None    Impression:  Choledocholithiasis status post successful sphincterotomy and 2 green pigmented stones removed    Recommendations:  Monitor for symptom improvement and monitor labs  Clear liquid diet and advance as tolerated  General Surgery evaluation for cholecystectomy      Narinder Romeo MD     Date: 12/10/2024    Time: 13:57 EST

## 2024-12-10 NOTE — CONSULTS
"  General Surgery Consult Note      Name: Henrik Mojica ADMIT: 2024   : 1985  PCP: Subhash Graves MD    MRN: [id number] LOS: 0 days   AGE/SEX: 39 y.o. male  ROOM: 229/1   [facility]      Chief Complaint   Patient presents with    Abdominal Pain     Pt has abd pain, above belly button, pt was dx last night at [place] with pancreatitis.  Pt nauseated.       Consulting Physician:  Dr. Narinder Romeo  Consult for:  Cholecystectomy     HPI  39 y.o. male with prior history of symptomatic cholelithiasis who presented to ED with 2 day history of worsening abdominal pain.  He has prior history of \"gallbladder attacks\" with RUQ pain, nausea.  Pain worse with fatty foods.  He reports increasing frequency of attacks in last few months.  He was noted to have elevated liver enzymes.  MRCP showed choledocholithisis.  He underwent ERCP today and General Surgery consulted for cholecystectomy.    Past Medical History:   Diagnosis Date    Cholelithiasis 23    Hypertension ?    On 2 medications for it.    Substance abuse 2005    Drug free since 2014     History reviewed. No pertinent surgical history.  History reviewed. No pertinent family history.    Social History     Tobacco Use    Smoking status: Former     Current packs/day: 0.00     Average packs/day: 2.0 packs/day for 23.7 years (47.3 ttl pk-yrs)     Types: Cigarettes, Cigars     Start date: 1999     Quit date: 2022     Years since quittin.2     Passive exposure: Past    Smokeless tobacco: Never   Vaping Use    Vaping status: Never Used   Substance Use Topics    Alcohol use: Not Currently    Drug use: Not Currently     Types: Cocaine(coke), Heroin, Hydrocodone, Morphine, Oxycodone     Comment: Drug free since 2014     Medications Prior to Admission   Medication Sig Dispense Refill Last Dose/Taking    clonazePAM (KlonoPIN) 1 MG tablet Take 1 tablet by mouth Daily.   Taking    lisinopril-hydrochlorothiazide (PRINZIDE,ZESTORETIC) " 20-25 MG per tablet Take 1 tablet by mouth Daily.   Taking    metoprolol succinate XL (TOPROL-XL) 25 MG 24 hr tablet Take 1 tablet by mouth Daily.   Taking    Testosterone Cypionate (DEPOTESTOTERONE CYPIONATE) 200 MG/ML injection Inject 1 mL into the appropriate muscle as directed by prescriber Every 7 (Seven) Days. Sun   Past Week    Vitamin E 180 MG (400 UNIT) capsule capsule Take 1 capsule by mouth 2 (Two) Times a Day.   Taking     clonazePAM, 1 mg, Oral, Daily  lisinopril, 20 mg, Oral, Q24H   And  hydroCHLOROthiazide, 25 mg, Oral, Q24H  metoprolol succinate XL, 25 mg, Oral, Daily  sodium chloride, 10 mL, Intravenous, Q12H      lactated ringers, 75 mL/hr, Last Rate: 75 mL/hr (12/10/24 0917)  phenylephrine, 0.5-3 mcg/kg/min        aluminum-magnesium hydroxide-simethicone    senna-docusate sodium **AND** polyethylene glycol **AND** bisacodyl **AND** bisacodyl    HYDROmorphone    ibuprofen    ondansetron ODT **OR** ondansetron    phenylephrine    sodium chloride    sodium chloride  Penicillins    Review of Systems:   As noted above in HPI    Vitals:  Temp:  [97.7 °F (36.5 °C)-98.4 °F (36.9 °C)] 97.7 °F (36.5 °C)  Heart Rate:  [56-90] 66  Resp:  [13-18] 18  BP: (105-144)/(55-87) 109/67  FiO2 (%):  [50 %] 50 %     Physical Exam:   NAD, alert  Nonlabored respirations  Abdomen soft, ND, mildly TTP in epigastrium  Extremities warm and well perfused, no gross deformities    Labs:  Results from last 7 days   Lab Units 12/10/24  0410 12/09/24  1352   WBC 10*3/mm3 5.33 5.20   HEMOGLOBIN g/dL 16.9 16.1   HEMATOCRIT % 51.0 50.1   PLATELETS 10*3/mm3 207 187     Results from last 7 days   Lab Units 12/10/24  0410 12/09/24  1352   SODIUM mmol/L 134* 135*   POTASSIUM mmol/L 4.0 3.7   CHLORIDE mmol/L 95* 95*   CO2 mmol/L 24.7 30.2*   BUN mg/dL 14 12   CREATININE mg/dL 1.14 1.25   CALCIUM mg/dL 9.9 9.7   BILIRUBIN mg/dL 11.0* 8.6*   ALK PHOS U/L 212* 183*   ALT (SGPT) U/L 636* 636*   AST (SGOT) U/L 319* 329*   GLUCOSE mg/dL 86 98      Imaging:  MRI Abdomen/MRCP 12/9/24  Impression:  1. Dilated biliary tree with small rounded filling defects in the common hepatic and common bile ducts suspicious for choledocholithiasis. Consider ERCP.  2. No evidence of acute cholecystitis or gallstone pancreatitis.  3. Incidental pancreatic divisum.    Assessment and Plan:  39 y.o. male with history of symptomatic cholelithiasis admitted with choledocholithiasis, s/p ERCP.    -Discussed recommendation for cholecystectomy  -Surgery, risks, benefits, alternatives discussed  -Risks discussed include bleeding, infection, hernia, conversion to open, CBD injury  -Pt expressed understanding and agreeable to cholecystectomy; will plan for OR tomorrow    Keri Hardin MD  12/10/24  16:35 EST

## 2024-12-10 NOTE — PLAN OF CARE
Goal Outcome Evaluation:  Plan of Care Reviewed With: patient        Progress: improving        Patient is alert and oriented x4, eyes looks jaundiced, complained of abdominal pain, given IV dilaudid. IVF running. NPO, GI consult.

## 2024-12-10 NOTE — ANESTHESIA PREPROCEDURE EVALUATION
Anesthesia Evaluation     NPO Solid Status: > 8 hours  NPO Liquid Status: > 8 hours           Airway   Mallampati: I  TM distance: >3 FB  Neck ROM: full  No difficulty expected  Dental - normal exam     Pulmonary - normal exam   Cardiovascular - normal exam    (+) hypertension      Neuro/Psych  GI/Hepatic/Renal/Endo      Musculoskeletal     Abdominal  - normal exam    Bowel sounds: normal.   Substance History      OB/GYN          Other                    Anesthesia Plan    ASA 2     general     intravenous induction     Anesthetic plan, risks, benefits, and alternatives have been provided, discussed and informed consent has been obtained with: patient.  Pre-procedure education provided  Plan discussed with CRNA.    CODE STATUS:    Level Of Support Discussed With: Patient  Code Status (Patient has no pulse and is not breathing): CPR (Attempt to Resuscitate)  Medical Interventions (Patient has pulse or is breathing): Full Support

## 2024-12-10 NOTE — PLAN OF CARE
Goal Outcome Evaluation:  Plan of Care Reviewed With: patient        Progress: improving  Outcome Evaluation: Patient was seen by GI, who wanted to do an ERCP. ERCP was completed and two bile stones were removed from patient. Patient states he feels better, and would like to see how he does without pain medication. The patient is sleeping peacefully at this point in time. Patient is to be NPO at midnight for a Laparscopic Cholecystectomy in the AM. Safety measures in place and call light within reach.

## 2024-12-11 ENCOUNTER — ANESTHESIA EVENT (OUTPATIENT)
Dept: PERIOP | Facility: HOSPITAL | Age: 39
End: 2024-12-11

## 2024-12-11 ENCOUNTER — INPATIENT HOSPITAL (OUTPATIENT)
Dept: URBAN - METROPOLITAN AREA HOSPITAL 84 | Facility: HOSPITAL | Age: 39
End: 2024-12-11

## 2024-12-11 ENCOUNTER — ANESTHESIA (OUTPATIENT)
Dept: PERIOP | Facility: HOSPITAL | Age: 39
End: 2024-12-11

## 2024-12-11 DIAGNOSIS — K80.50 CALCULUS OF BILE DUCT WITHOUT CHOLANGITIS OR CHOLECYSTITIS W: ICD-10-CM

## 2024-12-11 LAB
ALBUMIN SERPL-MCNC: 4.1 G/DL (ref 3.5–5.2)
ALBUMIN/GLOB SERPL: 1.5 G/DL
ALP SERPL-CCNC: 218 U/L (ref 39–117)
ALT SERPL W P-5'-P-CCNC: 537 U/L (ref 1–41)
AMYLASE SERPL-CCNC: 452 U/L (ref 28–100)
ANA SER QL: NEGATIVE
ANION GAP SERPL CALCULATED.3IONS-SCNC: 8.9 MMOL/L (ref 5–15)
AST SERPL-CCNC: 197 U/L (ref 1–40)
BASOPHILS # BLD AUTO: 0.02 10*3/MM3 (ref 0–0.2)
BASOPHILS NFR BLD AUTO: 0.2 % (ref 0–1.5)
BILIRUB SERPL-MCNC: 4.2 MG/DL (ref 0–1.2)
BUN SERPL-MCNC: 14 MG/DL (ref 6–20)
BUN/CREAT SERPL: 13 (ref 7–25)
CALCIUM SPEC-SCNC: 9.4 MG/DL (ref 8.6–10.5)
CHLORIDE SERPL-SCNC: 97 MMOL/L (ref 98–107)
CO2 SERPL-SCNC: 28.1 MMOL/L (ref 22–29)
CREAT SERPL-MCNC: 1.08 MG/DL (ref 0.76–1.27)
DEPRECATED RDW RBC AUTO: 40.5 FL (ref 37–54)
EGFRCR SERPLBLD CKD-EPI 2021: 90.6 ML/MIN/1.73
EOSINOPHIL # BLD AUTO: 0 10*3/MM3 (ref 0–0.4)
EOSINOPHIL NFR BLD AUTO: 0 % (ref 0.3–6.2)
ERYTHROCYTE [DISTWIDTH] IN BLOOD BY AUTOMATED COUNT: 13.3 % (ref 12.3–15.4)
GLOBULIN UR ELPH-MCNC: 2.7 GM/DL
GLUCOSE SERPL-MCNC: 120 MG/DL (ref 65–99)
HCT VFR BLD AUTO: 49.4 % (ref 37.5–51)
HGB BLD-MCNC: 16 G/DL (ref 13–17.7)
IMM GRANULOCYTES # BLD AUTO: 0.05 10*3/MM3 (ref 0–0.05)
IMM GRANULOCYTES NFR BLD AUTO: 0.4 % (ref 0–0.5)
LIPASE SERPL-CCNC: 1118 U/L (ref 13–60)
LKM-1 AB SER-ACNC: 1.2 UNITS (ref 0–20)
LYMPHOCYTES # BLD AUTO: 0.6 10*3/MM3 (ref 0.7–3.1)
LYMPHOCYTES NFR BLD AUTO: 5.1 % (ref 19.6–45.3)
MCH RBC QN AUTO: 26.8 PG (ref 26.6–33)
MCHC RBC AUTO-ENTMCNC: 32.4 G/DL (ref 31.5–35.7)
MCV RBC AUTO: 82.7 FL (ref 79–97)
MITOCHONDRIA M2 IGG SER-ACNC: 43 UNITS (ref 0–20)
MONOCYTES # BLD AUTO: 0.48 10*3/MM3 (ref 0.1–0.9)
MONOCYTES NFR BLD AUTO: 4.1 % (ref 5–12)
NEUTROPHILS NFR BLD AUTO: 10.58 10*3/MM3 (ref 1.7–7)
NEUTROPHILS NFR BLD AUTO: 90.2 % (ref 42.7–76)
NRBC BLD AUTO-RTO: 0 /100 WBC (ref 0–0.2)
PLATELET # BLD AUTO: 223 10*3/MM3 (ref 140–450)
PMV BLD AUTO: 11.1 FL (ref 6–12)
POTASSIUM SERPL-SCNC: 4.7 MMOL/L (ref 3.5–5.2)
PROT SERPL-MCNC: 6.8 G/DL (ref 6–8.5)
RBC # BLD AUTO: 5.97 10*6/MM3 (ref 4.14–5.8)
SMA IGG SER-ACNC: 4 UNITS (ref 0–19)
SODIUM SERPL-SCNC: 134 MMOL/L (ref 136–145)
WBC NRBC COR # BLD AUTO: 11.73 10*3/MM3 (ref 3.4–10.8)

## 2024-12-11 PROCEDURE — 47563 LAPARO CHOLECYSTECTOMY/GRAPH: CPT | Performed by: SURGERY

## 2024-12-11 PROCEDURE — 80053 COMPREHEN METABOLIC PANEL: CPT | Performed by: INTERNAL MEDICINE

## 2024-12-11 PROCEDURE — 25010000002 HYDROMORPHONE PER 4 MG: Performed by: SURGERY

## 2024-12-11 PROCEDURE — 25010000002 MIDAZOLAM PER 1 MG: Performed by: NURSE ANESTHETIST, CERTIFIED REGISTERED

## 2024-12-11 PROCEDURE — 25010000002 LIDOCAINE PF 2% 2 % SOLUTION: Performed by: NURSE ANESTHETIST, CERTIFIED REGISTERED

## 2024-12-11 PROCEDURE — 88304 TISSUE EXAM BY PATHOLOGIST: CPT | Performed by: SURGERY

## 2024-12-11 PROCEDURE — 25010000002 BUPIVACAINE 0.25 % SOLUTION: Performed by: SURGERY

## 2024-12-11 PROCEDURE — 25010000002 INDOCYANINE GREEN 25 MG RECONSTITUTED SOLUTION: Performed by: SURGERY

## 2024-12-11 PROCEDURE — 25010000002 SUGAMMADEX 200 MG/2ML SOLUTION: Performed by: NURSE ANESTHETIST, CERTIFIED REGISTERED

## 2024-12-11 PROCEDURE — 83690 ASSAY OF LIPASE: CPT | Performed by: INTERNAL MEDICINE

## 2024-12-11 PROCEDURE — 82150 ASSAY OF AMYLASE: CPT | Performed by: INTERNAL MEDICINE

## 2024-12-11 PROCEDURE — 8E0W4CZ ROBOTIC ASSISTED PROCEDURE OF TRUNK REGION, PERCUTANEOUS ENDOSCOPIC APPROACH: ICD-10-PCS | Performed by: SURGERY

## 2024-12-11 PROCEDURE — 25810000003 LACTATED RINGERS PER 1000 ML: Performed by: NURSE ANESTHETIST, CERTIFIED REGISTERED

## 2024-12-11 PROCEDURE — 25010000002 HYDROMORPHONE 1 MG/ML SOLUTION: Performed by: NURSE ANESTHETIST, CERTIFIED REGISTERED

## 2024-12-11 PROCEDURE — 25010000002 PROPOFOL 200 MG/20ML EMULSION: Performed by: NURSE ANESTHETIST, CERTIFIED REGISTERED

## 2024-12-11 PROCEDURE — 0FT44ZZ RESECTION OF GALLBLADDER, PERCUTANEOUS ENDOSCOPIC APPROACH: ICD-10-PCS | Performed by: SURGERY

## 2024-12-11 PROCEDURE — S2900 ROBOTIC SURGICAL SYSTEM: HCPCS | Performed by: SURGERY

## 2024-12-11 PROCEDURE — 25010000002 ONDANSETRON PER 1 MG: Performed by: NURSE ANESTHETIST, CERTIFIED REGISTERED

## 2024-12-11 PROCEDURE — 25810000003 LACTATED RINGERS PER 1000 ML: Performed by: INTERNAL MEDICINE

## 2024-12-11 PROCEDURE — 25010000002 DEXAMETHASONE PER 1 MG: Performed by: NURSE ANESTHETIST, CERTIFIED REGISTERED

## 2024-12-11 PROCEDURE — 99232 SBSQ HOSP IP/OBS MODERATE 35: CPT | Performed by: NURSE PRACTITIONER

## 2024-12-11 PROCEDURE — 25010000002 FENTANYL CITRATE (PF) 100 MCG/2ML SOLUTION: Performed by: NURSE ANESTHETIST, CERTIFIED REGISTERED

## 2024-12-11 PROCEDURE — 85025 COMPLETE CBC W/AUTO DIFF WBC: CPT | Performed by: INTERNAL MEDICINE

## 2024-12-11 PROCEDURE — 47563 LAPARO CHOLECYSTECTOMY/GRAPH: CPT

## 2024-12-11 PROCEDURE — 25010000002 CEFAZOLIN PER 500 MG: Performed by: SURGERY

## 2024-12-11 PROCEDURE — 25010000002 HYDROMORPHONE PER 4 MG: Performed by: INTERNAL MEDICINE

## 2024-12-11 PROCEDURE — 25010000002 ONDANSETRON PER 1 MG: Performed by: INTERNAL MEDICINE

## 2024-12-11 PROCEDURE — 25810000003 SODIUM CHLORIDE 0.9 % SOLUTION: Performed by: SURGERY

## 2024-12-11 DEVICE — MEDIUM-LARGE LIGATION CLIPS 6 CLIPS/CART
Type: IMPLANTABLE DEVICE | Site: ABDOMEN | Status: FUNCTIONAL
Brand: VAS-Q-CLIP

## 2024-12-11 RX ORDER — ONDANSETRON 2 MG/ML
INJECTION INTRAMUSCULAR; INTRAVENOUS AS NEEDED
Status: DISCONTINUED | OUTPATIENT
Start: 2024-12-11 | End: 2024-12-11 | Stop reason: SURG

## 2024-12-11 RX ORDER — HYDRALAZINE HYDROCHLORIDE 20 MG/ML
5 INJECTION INTRAMUSCULAR; INTRAVENOUS
Status: DISCONTINUED | OUTPATIENT
Start: 2024-12-11 | End: 2024-12-11 | Stop reason: HOSPADM

## 2024-12-11 RX ORDER — LIDOCAINE HYDROCHLORIDE 20 MG/ML
INJECTION, SOLUTION EPIDURAL; INFILTRATION; INTRACAUDAL; PERINEURAL AS NEEDED
Status: DISCONTINUED | OUTPATIENT
Start: 2024-12-11 | End: 2024-12-11 | Stop reason: SURG

## 2024-12-11 RX ORDER — PROCHLORPERAZINE EDISYLATE 5 MG/ML
10 INJECTION INTRAMUSCULAR; INTRAVENOUS ONCE AS NEEDED
Status: DISCONTINUED | OUTPATIENT
Start: 2024-12-11 | End: 2024-12-11 | Stop reason: HOSPADM

## 2024-12-11 RX ORDER — ACETAMINOPHEN 650 MG/1
650 SUPPOSITORY RECTAL EVERY 4 HOURS PRN
Status: DISCONTINUED | OUTPATIENT
Start: 2024-12-11 | End: 2024-12-11 | Stop reason: HOSPADM

## 2024-12-11 RX ORDER — ROCURONIUM BROMIDE 10 MG/ML
INJECTION, SOLUTION INTRAVENOUS AS NEEDED
Status: DISCONTINUED | OUTPATIENT
Start: 2024-12-11 | End: 2024-12-11 | Stop reason: SURG

## 2024-12-11 RX ORDER — DIPHENHYDRAMINE HYDROCHLORIDE 50 MG/ML
12.5 INJECTION INTRAMUSCULAR; INTRAVENOUS ONCE AS NEEDED
Status: DISCONTINUED | OUTPATIENT
Start: 2024-12-11 | End: 2024-12-11 | Stop reason: HOSPADM

## 2024-12-11 RX ORDER — PHENYLEPHRINE HCL IN 0.9% NACL 1 MG/10 ML
SYRINGE (ML) INTRAVENOUS AS NEEDED
Status: DISCONTINUED | OUTPATIENT
Start: 2024-12-11 | End: 2024-12-11 | Stop reason: SURG

## 2024-12-11 RX ORDER — PROMETHAZINE HYDROCHLORIDE 25 MG/1
25 TABLET ORAL ONCE AS NEEDED
Status: DISCONTINUED | OUTPATIENT
Start: 2024-12-11 | End: 2024-12-11 | Stop reason: HOSPADM

## 2024-12-11 RX ORDER — FENTANYL CITRATE 50 UG/ML
INJECTION, SOLUTION INTRAMUSCULAR; INTRAVENOUS AS NEEDED
Status: DISCONTINUED | OUTPATIENT
Start: 2024-12-11 | End: 2024-12-11 | Stop reason: SURG

## 2024-12-11 RX ORDER — BUPIVACAINE HYDROCHLORIDE 2.5 MG/ML
INJECTION, SOLUTION INFILTRATION; PERINEURAL AS NEEDED
Status: DISCONTINUED | OUTPATIENT
Start: 2024-12-11 | End: 2024-12-11 | Stop reason: HOSPADM

## 2024-12-11 RX ORDER — OXYCODONE HYDROCHLORIDE 5 MG/1
10 TABLET ORAL EVERY 4 HOURS PRN
Status: DISCONTINUED | OUTPATIENT
Start: 2024-12-11 | End: 2024-12-11 | Stop reason: HOSPADM

## 2024-12-11 RX ORDER — SODIUM CHLORIDE 9 MG/ML
125 INJECTION, SOLUTION INTRAVENOUS CONTINUOUS
Status: DISCONTINUED | OUTPATIENT
Start: 2024-12-11 | End: 2024-12-12 | Stop reason: HOSPADM

## 2024-12-11 RX ORDER — ACETAMINOPHEN 325 MG/1
650 TABLET ORAL ONCE AS NEEDED
Status: DISCONTINUED | OUTPATIENT
Start: 2024-12-11 | End: 2024-12-11 | Stop reason: HOSPADM

## 2024-12-11 RX ORDER — EPHEDRINE SULFATE 5 MG/ML
5 INJECTION INTRAVENOUS ONCE AS NEEDED
Status: DISCONTINUED | OUTPATIENT
Start: 2024-12-11 | End: 2024-12-11 | Stop reason: HOSPADM

## 2024-12-11 RX ORDER — INDOCYANINE GREEN AND WATER 25 MG
2.5 KIT INJECTION ONCE
Status: DISCONTINUED | OUTPATIENT
Start: 2024-12-11 | End: 2024-12-11 | Stop reason: SDUPTHER

## 2024-12-11 RX ORDER — OXYCODONE HYDROCHLORIDE 5 MG/1
5 TABLET ORAL ONCE AS NEEDED
Status: COMPLETED | OUTPATIENT
Start: 2024-12-11 | End: 2024-12-11

## 2024-12-11 RX ORDER — PROPOFOL 10 MG/ML
INJECTION, EMULSION INTRAVENOUS AS NEEDED
Status: DISCONTINUED | OUTPATIENT
Start: 2024-12-11 | End: 2024-12-11 | Stop reason: SURG

## 2024-12-11 RX ORDER — MIDAZOLAM HYDROCHLORIDE 1 MG/ML
INJECTION, SOLUTION INTRAMUSCULAR; INTRAVENOUS AS NEEDED
Status: DISCONTINUED | OUTPATIENT
Start: 2024-12-11 | End: 2024-12-11 | Stop reason: SURG

## 2024-12-11 RX ORDER — MEPERIDINE HYDROCHLORIDE 25 MG/ML
12.5 INJECTION INTRAMUSCULAR; INTRAVENOUS; SUBCUTANEOUS
Status: DISCONTINUED | OUTPATIENT
Start: 2024-12-11 | End: 2024-12-11 | Stop reason: HOSPADM

## 2024-12-11 RX ORDER — FENTANYL CITRATE 50 UG/ML
100 INJECTION, SOLUTION INTRAMUSCULAR; INTRAVENOUS
Status: DISCONTINUED | OUTPATIENT
Start: 2024-12-11 | End: 2024-12-11 | Stop reason: HOSPADM

## 2024-12-11 RX ORDER — SODIUM CHLORIDE, SODIUM LACTATE, POTASSIUM CHLORIDE, CALCIUM CHLORIDE 600; 310; 30; 20 MG/100ML; MG/100ML; MG/100ML; MG/100ML
INJECTION, SOLUTION INTRAVENOUS CONTINUOUS PRN
Status: DISCONTINUED | OUTPATIENT
Start: 2024-12-11 | End: 2024-12-11 | Stop reason: SURG

## 2024-12-11 RX ORDER — DEXMEDETOMIDINE HYDROCHLORIDE 100 UG/ML
INJECTION, SOLUTION INTRAVENOUS AS NEEDED
Status: DISCONTINUED | OUTPATIENT
Start: 2024-12-11 | End: 2024-12-11 | Stop reason: SURG

## 2024-12-11 RX ORDER — DEXAMETHASONE SODIUM PHOSPHATE 4 MG/ML
INJECTION, SOLUTION INTRA-ARTICULAR; INTRALESIONAL; INTRAMUSCULAR; INTRAVENOUS; SOFT TISSUE AS NEEDED
Status: DISCONTINUED | OUTPATIENT
Start: 2024-12-11 | End: 2024-12-11 | Stop reason: SURG

## 2024-12-11 RX ORDER — PROMETHAZINE HYDROCHLORIDE 25 MG/1
25 SUPPOSITORY RECTAL ONCE AS NEEDED
Status: DISCONTINUED | OUTPATIENT
Start: 2024-12-11 | End: 2024-12-11 | Stop reason: HOSPADM

## 2024-12-11 RX ORDER — HYDROCODONE BITARTRATE AND ACETAMINOPHEN 5; 325 MG/1; MG/1
1 TABLET ORAL EVERY 6 HOURS PRN
Status: DISCONTINUED | OUTPATIENT
Start: 2024-12-11 | End: 2024-12-12 | Stop reason: HOSPADM

## 2024-12-11 RX ADMIN — FENTANYL CITRATE 100 MCG: 50 INJECTION, SOLUTION INTRAMUSCULAR; INTRAVENOUS at 10:00

## 2024-12-11 RX ADMIN — SODIUM CHLORIDE 125 ML/HR: 9 INJECTION, SOLUTION INTRAVENOUS at 12:57

## 2024-12-11 RX ADMIN — DEXMEDETOMIDINE HYDROCHLORIDE 2 MCG: 100 INJECTION, SOLUTION INTRAVENOUS at 10:33

## 2024-12-11 RX ADMIN — HYDROMORPHONE HYDROCHLORIDE 1 MG: 1 INJECTION, SOLUTION INTRAMUSCULAR; INTRAVENOUS; SUBCUTANEOUS at 10:35

## 2024-12-11 RX ADMIN — SODIUM CHLORIDE, SODIUM LACTATE, POTASSIUM CHLORIDE, AND CALCIUM CHLORIDE: .6; .31; .03; .02 INJECTION, SOLUTION INTRAVENOUS at 11:08

## 2024-12-11 RX ADMIN — HYDROMORPHONE HYDROCHLORIDE 0.5 MG: 1 INJECTION, SOLUTION INTRAMUSCULAR; INTRAVENOUS; SUBCUTANEOUS at 22:04

## 2024-12-11 RX ADMIN — HYDROMORPHONE HYDROCHLORIDE 0.5 MG: 1 INJECTION, SOLUTION INTRAMUSCULAR; INTRAVENOUS; SUBCUTANEOUS at 15:36

## 2024-12-11 RX ADMIN — HYDROMORPHONE HYDROCHLORIDE 0.5 MG: 1 INJECTION, SOLUTION INTRAMUSCULAR; INTRAVENOUS; SUBCUTANEOUS at 06:16

## 2024-12-11 RX ADMIN — Medication 100 MCG: at 10:58

## 2024-12-11 RX ADMIN — ROCURONIUM BROMIDE 70 MG: 10 INJECTION, SOLUTION INTRAVENOUS at 10:06

## 2024-12-11 RX ADMIN — OXYCODONE 5 MG: 5 TABLET ORAL at 12:26

## 2024-12-11 RX ADMIN — HYDROCODONE BITARTRATE AND ACETAMINOPHEN 1 TABLET: 5; 325 TABLET ORAL at 23:40

## 2024-12-11 RX ADMIN — HYDROMORPHONE HYDROCHLORIDE 1 MG: 1 INJECTION, SOLUTION INTRAMUSCULAR; INTRAVENOUS; SUBCUTANEOUS at 11:41

## 2024-12-11 RX ADMIN — DEXAMETHASONE SODIUM PHOSPHATE 8 MG: 4 INJECTION, SOLUTION INTRAMUSCULAR; INTRAVENOUS at 10:13

## 2024-12-11 RX ADMIN — HYDROMORPHONE HYDROCHLORIDE 0.5 MG: 1 INJECTION, SOLUTION INTRAMUSCULAR; INTRAVENOUS; SUBCUTANEOUS at 01:39

## 2024-12-11 RX ADMIN — PROPOFOL 200 MG: 10 INJECTION, EMULSION INTRAVENOUS at 10:06

## 2024-12-11 RX ADMIN — INDOCYANINE GREEN AND WATER 2.5 MG: KIT at 06:17

## 2024-12-11 RX ADMIN — ONDANSETRON 4 MG: 2 INJECTION INTRAMUSCULAR; INTRAVENOUS at 11:51

## 2024-12-11 RX ADMIN — DEXMEDETOMIDINE HYDROCHLORIDE 2 MCG: 100 INJECTION, SOLUTION INTRAVENOUS at 10:24

## 2024-12-11 RX ADMIN — DEXMEDETOMIDINE HYDROCHLORIDE 2 MCG: 100 INJECTION, SOLUTION INTRAVENOUS at 10:51

## 2024-12-11 RX ADMIN — HYDROMORPHONE HYDROCHLORIDE 0.5 MG: 1 INJECTION, SOLUTION INTRAMUSCULAR; INTRAVENOUS; SUBCUTANEOUS at 12:09

## 2024-12-11 RX ADMIN — ONDANSETRON 4 MG: 2 INJECTION INTRAMUSCULAR; INTRAVENOUS at 11:02

## 2024-12-11 RX ADMIN — HYDROMORPHONE HYDROCHLORIDE 0.5 MG: 1 INJECTION, SOLUTION INTRAMUSCULAR; INTRAVENOUS; SUBCUTANEOUS at 19:04

## 2024-12-11 RX ADMIN — DEXMEDETOMIDINE HYDROCHLORIDE 4 MCG: 100 INJECTION, SOLUTION INTRAVENOUS at 10:10

## 2024-12-11 RX ADMIN — MIDAZOLAM 2 MG: 1 INJECTION INTRAMUSCULAR; INTRAVENOUS at 09:54

## 2024-12-11 RX ADMIN — LIDOCAINE HYDROCHLORIDE 100 MG: 20 INJECTION, SOLUTION EPIDURAL; INFILTRATION; INTRACAUDAL; PERINEURAL at 10:06

## 2024-12-11 RX ADMIN — SUGAMMADEX 200 MG: 100 INJECTION, SOLUTION INTRAVENOUS at 11:03

## 2024-12-11 RX ADMIN — Medication 10 ML: at 08:58

## 2024-12-11 RX ADMIN — SODIUM CHLORIDE, SODIUM LACTATE, POTASSIUM CHLORIDE, CALCIUM CHLORIDE 75 ML/HR: 20; 30; 600; 310 INJECTION, SOLUTION INTRAVENOUS at 22:04

## 2024-12-11 RX ADMIN — SODIUM CHLORIDE, SODIUM LACTATE, POTASSIUM CHLORIDE, AND CALCIUM CHLORIDE: .6; .31; .03; .02 INJECTION, SOLUTION INTRAVENOUS at 09:52

## 2024-12-11 RX ADMIN — CEFAZOLIN 2000 MG: 2 INJECTION, POWDER, FOR SOLUTION INTRAMUSCULAR; INTRAVENOUS at 09:52

## 2024-12-11 NOTE — CASE MANAGEMENT/SOCIAL WORK
Continued Stay Note  [facility]     Patient Name: Henrik Mojica  MRN: [id number]  Today's Date: 12/11/2024    Admit Date: 12/9/2024    Plan: Return home. Needs CM interview   Discharge Plan       Row Name 12/11/24 0932       Plan    Plan Comments Barriers:   Today Robotic assisted laparoscopic cholecystectomy, possible open, possible cholangiogram. CM attempted interview this morning but out of room                      Isatu ESCOBEDO,RN Case Manager  [facility]  Phone: Desk- [phone] cell- [phone]

## 2024-12-11 NOTE — ANESTHESIA PREPROCEDURE EVALUATION
Anesthesia Evaluation     Patient summary reviewed and Nursing notes reviewed   NPO Solid Status: > 8 hours  NPO Liquid Status: > 8 hours           Airway   Mallampati: II  TM distance: >3 FB  Neck ROM: full  No difficulty expected  Dental          Pulmonary    Cardiovascular     (+) hypertension      Neuro/Psych  GI/Hepatic/Renal/Endo      Musculoskeletal     Abdominal    Substance History      OB/GYN          Other                    Anesthesia Plan    ASA 2     general     intravenous induction     Anesthetic plan, risks, benefits, and alternatives have been provided, discussed and informed consent has been obtained with: patient.    Plan discussed with CRNA.    CODE STATUS:    Level Of Support Discussed With: Patient  Code Status (Patient has no pulse and is not breathing): CPR (Attempt to Resuscitate)  Medical Interventions (Patient has pulse or is breathing): Full Support

## 2024-12-11 NOTE — PROGRESS NOTES
LOS: 1 day   Patient Care Team:  Subhash Graves MD as PCP - General (Family Medicine)  Lyndon Yan MD as Consulting Physician (General Surgery)      Subjective   Subjective: Patient had cholecystectomy today and has postoperative tenderness.  This morning and last night he did have some upper abdominal pain that could radiate to his back.  Denies nausea or vomiting.  Tolerated clear liquids last evening.      ROS:   Review of Systems   Respiratory:  Negative for cough and shortness of breath.    Cardiovascular:  Negative for chest pain and palpitations.   Gastrointestinal:  Positive for abdominal pain. Negative for nausea and vomiting.   Neurological:  Negative for dizziness.   Psychiatric/Behavioral:  Negative for agitation and confusion.         Medication Review:   Scheduled Meds:clonazePAM, 1 mg, Oral, Daily  lisinopril, 20 mg, Oral, Q24H   And  hydroCHLOROthiazide, 25 mg, Oral, Q24H  metoprolol succinate XL, 25 mg, Oral, Daily  sodium chloride, 10 mL, Intravenous, Q12H      Continuous Infusions:lactated ringers, 75 mL/hr, Last Rate: 75 mL/hr (12/10/24 1912)  phenylephrine, 0.5-3 mcg/kg/min  phenylephrine, 0.5-3 mcg/kg/min  sodium chloride, 125 mL/hr, Last Rate: 125 mL/hr (12/11/24 1257)      PRN Meds:.  aluminum-magnesium hydroxide-simethicone    senna-docusate sodium **AND** polyethylene glycol **AND** bisacodyl **AND** bisacodyl    HYDROcodone-acetaminophen    HYDROmorphone    ibuprofen    ondansetron ODT **OR** ondansetron    oxyCODONE    phenylephrine    phenylephrine    sodium chloride    sodium chloride      Objective     Vital Signs  Temp:  [97.6 °F (36.4 °C)-98.2 °F (36.8 °C)] 97.8 °F (36.6 °C)  Heart Rate:  [] 97  Resp:  [8-19] 16  BP: (105-158)/(55-91) 137/86  FiO2 (%):  [42 %] 42 %    Physical Exam:    General Appearance:    Awake and alert, in no acute distress   Head:    Normocephalic, without obvious abnormality   Eyes:          Conjunctivae normal, anicteric sclera   Ears:     Hearing intact   Throat:   No oral lesions, no thrush, oral mucosa moist       Lungs:     Respirations regular, even and unlabored       Abdomen:     Soft, generalized tenderness, no rebound or guarding, non-distended   Rectal:     Deferred   Extremities:   No edema, no cyanosis, no redness   Skin:   No bleeding, bruising or rash, no jaundice   Neurologic:   Sensation intact        Results Review:    CBC  Results from last 7 days   Lab Units 12/11/24  0130 12/10/24  0410 12/09/24  1352   RBC 10*6/mm3 5.97* 6.11* 5.93*   WBC 10*3/mm3 11.73* 5.33 5.20   HEMOGLOBIN g/dL 16.0 16.9 16.1   PLATELETS 10*3/mm3 223 207 187       CMP  Results from last 7 days   Lab Units 12/11/24  0130 12/10/24  0410 12/09/24  1352   SODIUM mmol/L 134* 134* 135*   POTASSIUM mmol/L 4.7 4.0 3.7   CHLORIDE mmol/L 97* 95* 95*   CO2 mmol/L 28.1 24.7 30.2*   BUN mg/dL 14 14 12   CREATININE mg/dL 1.08 1.14 1.25   GLUCOSE mg/dL 120* 86 98   ALBUMIN g/dL 4.1 4.3 4.4   BILIRUBIN mg/dL 4.2* 11.0* 8.6*   ALK PHOS U/L 218* 212* 183*   AST (SGOT) U/L 197* 319* 329*   ALT (SGPT) U/L 537* 636* 636*       Amylase and Lipase  Results from last 7 days   Lab Units 12/11/24  0130 12/10/24  0410 12/09/24  1352   AMYLASE U/L 452*  --   --    LIPASE U/L 1,118* 48 74*       CRP         Imaging Results (Last 24 Hours)       Procedure Component Value Units Date/Time    FL ERCP pancreatic and biliary ducts [[id number]] Collected: 12/10/24 1404     Updated: 12/10/24 1408    Narrative:      FL ERCP PANCREATIC AND BILIARY DUCTS    Date of Exam: 12/10/2024 1:30 PM EST    Indication: ENDOSCOPIC RETROGRADE CHOLANGIOPANCREATOGRAPHY WITH SPHINCTEROTOMY, BALLOON CLEARANCE OF BILE DUCT, AND STONE EXTRACTION in ENDO 2.     Comparison:  MRI abdomen with MRCP 12/9/2024. Gallbladder ultrasound 5/27/2024. CT abdomen pelvis 12/10/2023.    Technique:  A series of radiographic digital spot films were obtained in conjunction with a endoscopic catheterization of the biliary and pancreatic  ductal system, performed by the gastroenterologist.    Fluoroscopic Time: 2.4 minutes    Number of Images: 4 spot fluoroscopic series images    Findings:  ERCP was performed utilizing fluoroscopic guidance. Contrast was injected retrograde into the common bile duct. Rounded filling defect in the lower CBD. Upstream common bile duct demonstrates mild diffuse fusiform dilation. No significant intrahepatic   biliary dilation is seen.      Impression:      Impression:  ERCP with fluoroscopy. Please refer to the procedure report for findings and recommendations.      Electronically Signed: Jolene Shea MD    12/10/2024 2:06 PM EST    Workstation ID: [id number]              Assessment & Plan   Choledocholithiasis  Upper abdominal pain  Elevated liver enzymes  Pancreatitis  Cholelithiasis       Plan:  39-year-old male admitted 12/9/2024 with worsening abdominal pain and history of gallstones.  Found to have significant increase in liver enzymes and choledocholithiasis on MRCP.     Status post ERCP 12/10/2024 by Dr. Romeo with choledocholithiasis status post sphincterotomy and 2 stones removed.    Patient has generalized postoperative tenderness.  He had cholecystectomy performed today.  Last evening and this morning he did have upper abdominal pain radiating to his back and lipase was elevated this morning at 1118, amylase 452.  Suspect post ERCP pancreatitis.  However, last evening he did tolerate clear liquids fairly well.  He is taking sips of ice water currently.  Total bilirubin came down to 4.2, alk phos 218, , and .  WBC 11.  Hemoglobin normal.  Continue IV fluids and he has been started on clear liquids.  I advised only small amounts of liquids and to discontinue the liquids if his abdominal pain increases.  Continue to monitor labs and provide supportive care.    Carlota Orozco, APRN  12/11/24  13:40 EST

## 2024-12-11 NOTE — PLAN OF CARE
Goal Outcome Evaluation:     Problem: Adult Inpatient Plan of Care  Goal: Plan of Care Review  Outcome: Progressing  Goal: Patient-Specific Goal (Individualized)  Outcome: Progressing  Goal: Absence of Hospital-Acquired Illness or Injury  Outcome: Progressing  Intervention: Identify and Manage Fall Risk  Recent Flowsheet Documentation  Taken 12/11/2024 1536 by Esther Fierro RN  Safety Promotion/Fall Prevention:   safety round/check completed   room organization consistent   clutter free environment maintained  Taken 12/11/2024 1400 by Esther Fierro RN  Safety Promotion/Fall Prevention:   safety round/check completed   room organization consistent  Taken 12/11/2024 1256 by Esther Fierro RN  Safety Promotion/Fall Prevention:   safety round/check completed   room organization consistent  Taken 12/11/2024 1000 by Esther Fierro RN  Safety Promotion/Fall Prevention: patient off unit  Taken 12/11/2024 0800 by Esther Fierro RN  Safety Promotion/Fall Prevention:   safety round/check completed   room organization consistent   clutter free environment maintained  Intervention: Prevent Skin Injury  Recent Flowsheet Documentation  Taken 12/11/2024 1256 by Esther Fierro RN  Body Position: position changed independently  Taken 12/11/2024 0800 by Esther Fierro RN  Body Position: position changed independently  Intervention: Prevent Infection  Recent Flowsheet Documentation  Taken 12/11/2024 0800 by Esther Fierro RN  Infection Prevention:   hand hygiene promoted   rest/sleep promoted   personal protective equipment utilized  Goal: Optimal Comfort and Wellbeing  Outcome: Progressing  Intervention: Monitor Pain and Promote Comfort  Recent Flowsheet Documentation  Taken 12/11/2024 1536 by Esther Fierro RN  Pain Management Interventions: pain medication given  Intervention: Provide Person-Centered Care  Recent Flowsheet Documentation  Taken 12/11/2024 0800 by Esther Fierro RN  Trust Relationship/Rapport:   care explained   questions  answered   thoughts/feelings acknowledged  Goal: Readiness for Transition of Care  Outcome: Progressing     Problem: Cholecystectomy  Goal: Absence of Infection Signs and Symptoms  Outcome: Progressing  Goal: Anesthesia/Sedation Recovery  Outcome: Progressing  Intervention: Optimize Anesthesia Recovery  Recent Flowsheet Documentation  Taken 12/11/2024 1536 by Esther Fierro RN  Safety Promotion/Fall Prevention:   safety round/check completed   room organization consistent   clutter free environment maintained  Taken 12/11/2024 1400 by Esther Fierro RN  Safety Promotion/Fall Prevention:   safety round/check completed   room organization consistent  Taken 12/11/2024 1256 by Esther Fierro RN  Safety Promotion/Fall Prevention:   safety round/check completed   room organization consistent  Taken 12/11/2024 1000 by Esther Fierro RN  Safety Promotion/Fall Prevention: patient off unit  Taken 12/11/2024 0800 by Esther Fierro RN  Safety Promotion/Fall Prevention:   safety round/check completed   room organization consistent   clutter free environment maintained  Goal: Pain Control and Function  Outcome: Progressing  Intervention: Prevent or Manage Pain  Recent Flowsheet Documentation  Taken 12/11/2024 1536 by Esther Fierro RN  Pain Management Interventions: pain medication given  Taken 12/11/2024 0800 by Esther Fierro RN  Diversional Activities: television

## 2024-12-11 NOTE — ANESTHESIA PROCEDURE NOTES
Airway  Urgency: elective    Date/Time: 12/11/2024 10:08 AM  Airway not difficult    General Information and Staff    Patient location during procedure: OR  Anesthesiologist: David Galicia MD  CRNA/CAA: Jones Short CRNA    Indications and Patient Condition  Indications for airway management: airway protection    Preoxygenated: yes  MILS not maintained throughout  Mask difficulty assessment: 2 - vent by mask + OA or adjuvant +/- NMBA    Final Airway Details  Final airway type: endotracheal airway      Successful airway: ETT  Cuffed: yes   Successful intubation technique: video laryngoscopy  Facilitating devices/methods: Bougie  Endotracheal tube insertion site: oral  Blade size: 3  ETT size (mm): 7.5  Cormack-Lehane Classification: grade I - full view of glottis  Placement verified by: chest auscultation and capnometry   Cuff volume (mL): 6  Measured from: teeth  ETT/EBT  to teeth (cm): 23  Number of attempts at approach: 1  Assessment: lips, teeth, and gum same as pre-op and atraumatic intubation

## 2024-12-11 NOTE — OP NOTE
Operative Report    Patient Name:  Henrik Mojica  YOB: 1985    Date of Surgery:  12/11/2024    Pre-op Diagnosis:   Epigastric pain [R10.13]  Choledocholithiasis [K80.50]       Post-op Diagnosis:   Epigastric pain [R10.13]  Choledocholithiasis [K80.50]  Pancreatitis    Procedure(s):  Robotic assisted laparoscopic cholecystectomy    Staff:  Surgeon(s):  Keri aHrdin MD    Circulator: Shania Nava RN; Wyatt Tinoco RN  Scrub Person: Baljinder Granger; Mikie Rodriguez RN  Assistant: Ofe Zuluaga CSFA  was responsible for performing the following activities: Closing, Placing Dressing, Held/Positioned Camera, and bedside assist robotics case  and their skilled assistance was necessary for the success of this case.    Anesthesia: General    Estimated Blood Loss: 10 mL    Implants:   None    Specimen:          Specimens       ID Source Type Tests Collected By Collected At Frozen?    A Gallbladder Tissue TISSUE PATHOLOGY EXAM   Keri Hardin MD 12/11/24 1049 No    Description: gallbladder w/ contents            Findings: Periportal hemorrhagic inflammation noted at the start of the case prior to dissection.  Critical view obtained.    Complications: None immediate    Clinical Indications: The patient is a 39 year old male who presented to the emergency department with abdominal pain.  Workup showed the patient to have choledocholithiasis.  He had prior episodes of symptomatic cholelithiasis.  ERCP was performed on 12/10/2024 and general surgery was consulted for cholecystectomy.  The surgery along with the associated risks, benefits, and alternatives were discussed with the patient. The risks include but are not limited to bleeding, infection, hernia, conversion to open, and common bile duct injury requiring additional procedures. The patient expressed understanding and wished to proceed. Informed consent was obtained.    Description of Procedure:  The patient was brought to the operating room  and placed in the supine position with both arms out. Bilateral sequential compression stockings placed on the lower extremities. The patient was induced and intubated by the Anesthesia service. Perioperative antibiotics were administered. The patient's abdomen was then prepped and draped in the usual sterile fashion. A time out was performed.    After confirming with anesthesia that an orogastric tube was in place and to suction we made a small stab incision in the left upper quadrant at [facility]. A water drop test was performed indicating we were likely intra-abdominal. Insufflation was initiated and a low opening pressure reassured us that we were intra-abdominal. We placed an 8mm robotic trocar just above the umbilicus. The camera was introduced and the abdomen was inspected to ensure we had not caused any injuries with placement of the veress needle or the initial trocar. Under direct visualization we placed a trocar one handbreadth to the left of the umbilicus and another one handbreadth to the right of the umbilicus. A fourth 8mm robotic trocar was placed in the left lateral subcostal region. The patient was placed in reverse Trendelenburg position with the right side up. The robot was docked and the camera and instruments loaded and positioned under direct visualization. At this point I sat down at the console to begin the dissection. The fundus of the gallbladder was retracted cephalad and laterally.  I immediately noticed that prior to any dissection there was fairly extensive inflammation in the periportal region with hemorrhagic changes but without any active bleeding.    I then began with the dissection.  Peritoneal attachments were taken down staying high on the gallbladder and away from the common bile duct. The cystic artery and cystic duct were dissected out and the critical view was obtained.  The inferior half of the gallbladder was dissected away from the liver revealing only two structures  going directly to the gallbladder.   The patient had been administered indocyanine green prior to the start of the case and using Firefly technology we were able to see that the cystic duct was green and coursed towards the common bile duct but the common bile duct itself was not visualized. The cystic artery and duct were clipped and divided. The gallbladder was then dissected from the gallbladder fossa with electrocautery. The gallbladder fossa was inspected and was hemostatic. The clips were inspected and were in good position. The gallbladder was placed in an endocatch bag and removed through the trocar site to the left of the umbilicus.  The fascial incision had to be bluntly expanded with a hemostat to allow removal of the gallbladder.  The fascial incision was closed with a transfascial stitch using 0 vicryl suture and the laparoscopic port closure device.  The fascial closure was checked and appeared intact.  The robot was undocked and the remaining ports were removed under direct visualization except for the port in the midline and all were hemostatic. Pneumoperitoneum was released and the supraumbilical trocar was removed.  The incisions were closed with 4-0 vicryl, cleaned, and dressed with sterile dressings.    The patient tolerated the procedure well.  He was awakened and extubated by anesthesia and then transferred to the recovery room in stable condition. At the completion of the case, all instrument, needle, and sponge counts were correct.     Keri Hardin MD     Date: 12/11/2024  Time: 11:25 EST    This note was created using Dragon Voice Recognition software.

## 2024-12-11 NOTE — ANESTHESIA POSTPROCEDURE EVALUATION
Patient: Henrik Mojica    Procedure Summary       Date: 12/11/24 Room / Location: [facility] OR 08 / [facility] MAIN OR    Anesthesia Start: 0958 Anesthesia Stop: 1126    Procedure: Robotic assisted laparoscopic cholecystectomy, possible open, possible cholangiogram (Abdomen) Diagnosis:       Epigastric pain      Choledocholithiasis      (Epigastric pain [R10.13])      (Choledocholithiasis [K80.50])    Surgeons: Keri Hardin MD Provider: David Galicia MD    Anesthesia Type: general ASA Status: 2            Anesthesia Type: general    Vitals  Vitals Value Taken Time   /89 12/11/24 1232   Temp 97.9 °F (36.6 °C) 12/11/24 1232   Pulse 93 12/11/24 1233   Resp 16 12/11/24 1232   SpO2 92 % 12/11/24 1233   Vitals shown include unfiled device data.        Post Anesthesia Care and Evaluation    Patient location during evaluation: PACU  Patient participation: complete - patient participated  Level of consciousness: awake  Pain scale: See nurse's notes for pain score.  Pain management: adequate    Airway patency: patent  Anesthetic complications: No anesthetic complications  PONV Status: none  Cardiovascular status: acceptable  Respiratory status: acceptable and spontaneous ventilation  Hydration status: acceptable    Comments: Patient seen and examined postoperatively; vital signs stable; SpO2 greater than or equal to 90%; cardiopulmonary status stable; nausea/vomiting adequately controlled; pain adequately controlled; no apparent anesthesia complications; patient discharged from anesthesia care when discharge criteria were met

## 2024-12-11 NOTE — PLAN OF CARE
Problem: Cholecystectomy  Goal: Absence of Infection Signs and Symptoms  Outcome: Progressing  Goal: Pain Control and Function  Outcome: Progressing  Intervention: Prevent or Manage Pain  Recent Flowsheet Documentation  Taken 12/10/2024 2022 by Amber Flannery, RN  Diversional Activities: television   Goal Outcome Evaluation:      Medicated for pain with dilaudid

## 2024-12-11 NOTE — PROGRESS NOTES
[facility] MEDICINE SERVICE  DAILY PROGRESS NOTE    NAME: Henrik Mojica  : 1985  MRN: [id number]      LOS: 1 day     PROVIDER OF SERVICE: Chadd Pacheco MD    Chief Complaint: Abdominal pain    Subjective:     Interval History:    Patient seen and evaluated at bedside.   H&P, consults, labs and imaging reviewed.  Wife at the bedside  Treatment plan discussed with patient. All questions addressed.     Review of Systems:   All 21 ROS were negative except mentioned above.    Objective:     Vital Signs  Temp:  [97.6 °F (36.4 °C)-98.4 °F (36.9 °C)] 98.1 °F (36.7 °C)  Heart Rate:  [] 100  Resp:  [12-19] 19  BP: (105-158)/(55-91) 138/84  Flow (L/min) (Oxygen Therapy):  [2] 2  FiO2 (%):  [42 %-50 %] 42 %   Body mass index is 30.45 kg/m².    Physical Exam   General: No acute distress, appears stated age  Neuro: Awake and alert, oriented x3, no focal deficits appreciated  Head: atraumatic, normocephalic  HEENT: EOMI, anicteric, normal sclera and conjunctivae, moist mucus membranes  Neck: supple, no lymphadenopathy  CV: RRR, soft heart sounds, no murmurs appreciated, no peripheral edema  Pulm: Decreased breath sounds, no increased work of breathing, no adventitious sounds  Abd: Soft, upper quadrant and epigastric tenderness, positive rebound tenderness, nondistended  Skin: Warm, dry and intact  Psych: Appropriate mood and affect    Scheduled Meds   clonazePAM, 1 mg, Oral, Daily  lisinopril, 20 mg, Oral, Q24H   And  hydroCHLOROthiazide, 25 mg, Oral, Q24H  metoprolol succinate XL, 25 mg, Oral, Daily  sodium chloride, 10 mL, Intravenous, Q12H       PRN Meds     aluminum-magnesium hydroxide-simethicone    senna-docusate sodium **AND** polyethylene glycol **AND** bisacodyl **AND** bisacodyl    bupivacaine    HYDROmorphone    ibuprofen    ondansetron ODT **OR** ondansetron    [Transfer Hold] oxyCODONE    phenylephrine    sodium chloride    sodium chloride   Infusions  lactated ringers, 75 mL/hr, Last Rate: 75  mL/hr (12/10/24 1912)  phenylephrine, 0.5-3 mcg/kg/min          Diagnostic Data    Results from last 7 days   Lab Units 12/11/24  0130   WBC 10*3/mm3 11.73*   HEMOGLOBIN g/dL 16.0   HEMATOCRIT % 49.4   PLATELETS 10*3/mm3 223   GLUCOSE mg/dL 120*   CREATININE mg/dL 1.08   BUN mg/dL 14   SODIUM mmol/L 134*   POTASSIUM mmol/L 4.7   AST (SGOT) U/L 197*   ALT (SGPT) U/L 537*   ALK PHOS U/L 218*   BILIRUBIN mg/dL 4.2*   ANION GAP mmol/L 8.9       FL ERCP pancreatic and biliary ducts    Result Date: 12/10/2024  Impression: ERCP with fluoroscopy. Please refer to the procedure report for findings and recommendations. Electronically Signed: Jolene Shea MD  12/10/2024 2:06 PM EST  Workstation ID: [id number]    MRI abdomen wo contrast mrcp    Result Date: 12/10/2024  Impression: 1. Dilated biliary tree with small rounded filling defects in the common hepatic and common bile ducts suspicious for choledocholithiasis. Consider ERCP. 2. No evidence of acute cholecystitis or gallstone pancreatitis. 3. Incidental pancreatic divisum. Electronically Signed: Agus Grande MD  12/10/2024 7:44 AM EST  Workstation ID: [id number]     Interval results reviewed.    Assessment/Plan:   Acute Cholecystitis  Choledocholithiasis  Status post ERCP  Hypertension  Pancreactic divisum  Transaminitis    Patient's status post ERCP and will be going for cholecystectomy for symptomatic gallstones.  Will resume blood pressure medicines after surgery  Treatment plan discussed with RN.     VTE Prophylaxis:  Mechanical VTE prophylaxis orders are present.         Code status is   Code Status and Medical Interventions: CPR (Attempt to Resuscitate); Full Support   Ordered at: 12/09/24 1521     Level Of Support Discussed With:    Patient     Code Status (Patient has no pulse and is not breathing):    CPR (Attempt to Resuscitate)     Medical Interventions (Patient has pulse or is breathing):    Full Support       Plan for disposition:     Barriers to Discharge:  Going for surgery  Anticipated Date of Discharge: 12/12/2024  Place of Discharge: Home      Time: 40 minutes     Signature: Electronically signed by Chadd Pacheco MD, 12/11/24, 10:38 EST.  [facility] Hospitalist Team

## 2024-12-11 NOTE — CASE MANAGEMENT/SOCIAL WORK
Discharge Planning Assessment   [facility]     Patient Name: Henrik Mojica  MRN: [id number]  Today's Date: 12/11/2024    Admit Date: 12/9/2024    Plan: Return home with wife   Discharge Needs Assessment       Row Name 12/11/24 1614       Living Environment    People in Home spouse    Name(s) of People in Home dafne Doherty    Current Living Arrangements home    Potentially Unsafe Housing Conditions none    In the past 12 months has the electric, gas, oil, or water company threatened to shut off services in your home? No    Primary Care Provided by self    Provides Primary Care For no one    Family Caregiver if Needed spouse    Quality of Family Relationships helpful;involved;supportive    Able to Return to Prior Arrangements yes       Resource/Environmental Concerns    Resource/Environmental Concerns none    Transportation Concerns none       Transportation Needs    In the past 12 months, has lack of transportation kept you from medical appointments or from getting medications? no    In the past 12 months, has lack of transportation kept you from meetings, work, or from getting things needed for daily living? No       Food Insecurity    Within the past 12 months, you worried that your food would run out before you got the money to buy more. Never true    Within the past 12 months, the food you bought just didn't last and you didn't have money to get more. Never true       Transition Planning    Patient/Family Anticipates Transition to home with family    Patient/Family Anticipated Services at Transition none    Transportation Anticipated car, drives self;family or friend will provide       Discharge Needs Assessment    Readmission Within the Last 30 Days no previous admission in last 30 days    Equipment Currently Used at Home none    Concerns to be Addressed no discharge needs identified    Anticipated Changes Related to Illness none    Equipment Needed After Discharge none                   Discharge Plan        Row Name 12/11/24 1616       Plan    Plan Return home with wife    Plan Comments CM met with Mr. Mojica who is a/o and said he lives with his wife, drives, works fulltime and does not use any medical equipment. PCP and Pharmacy confirmed. He confirmed he does not have any medical insurance and agrees to talk to someone with [employer]; they have attempted but he was off the floor. He denied any financial concerns                       Demographic Summary       Row Name 12/11/24 1613       General Information    Admission Type inpatient    Arrived From emergency department    Referral Source admission list    Reason for Consult discharge planning    Preferred Language English       Contact Information    Permission Granted to Share Info With                    Functional Status       Row Name 12/11/24 1613       Functional Status    Usual Activity Tolerance excellent    Current Activity Tolerance good       Functional Status, IADL    Medications independent    Meal Preparation independent    Housekeeping independent    Laundry independent    Shopping independent       Mental Status    General Appearance WDL WDL       Mental Status Summary    Recent Changes in Mental Status/Cognitive Functioning no changes       Employment/    Employment Status employed full-time                    Isatu ESCOBEDO,RN Case Manager  [facility]  Phone: Desk- [phone] cell- [phone]

## 2024-12-12 ENCOUNTER — INPATIENT HOSPITAL (OUTPATIENT)
Dept: URBAN - METROPOLITAN AREA HOSPITAL 84 | Facility: HOSPITAL | Age: 39
End: 2024-12-12

## 2024-12-12 ENCOUNTER — READMISSION MANAGEMENT (OUTPATIENT)
Dept: CALL CENTER | Facility: HOSPITAL | Age: 39
End: 2024-12-12

## 2024-12-12 VITALS
WEIGHT: 244.05 LBS | OXYGEN SATURATION: 91 % | RESPIRATION RATE: 15 BRPM | HEIGHT: 75 IN | TEMPERATURE: 97.7 F | HEART RATE: 96 BPM | BODY MASS INDEX: 30.34 KG/M2 | DIASTOLIC BLOOD PRESSURE: 82 MMHG | SYSTOLIC BLOOD PRESSURE: 136 MMHG

## 2024-12-12 DIAGNOSIS — K80.50 CALCULUS OF BILE DUCT WITHOUT CHOLANGITIS OR CHOLECYSTITIS W: ICD-10-CM

## 2024-12-12 DIAGNOSIS — K85.90 ACUTE PANCREATITIS WITHOUT NECROSIS OR INFECTION, UNSPECIFIE: ICD-10-CM

## 2024-12-12 LAB
ALBUMIN SERPL-MCNC: 3.7 G/DL (ref 3.5–5.2)
ALBUMIN/GLOB SERPL: 1.5 G/DL
ALP SERPL-CCNC: 159 U/L (ref 39–117)
ALT SERPL W P-5'-P-CCNC: 424 U/L (ref 1–41)
AMYLASE SERPL-CCNC: 134 U/L (ref 28–100)
ANION GAP SERPL CALCULATED.3IONS-SCNC: 9.2 MMOL/L (ref 5–15)
AST SERPL-CCNC: 152 U/L (ref 1–40)
BASOPHILS # BLD AUTO: 0.02 10*3/MM3 (ref 0–0.2)
BASOPHILS NFR BLD AUTO: 0.2 % (ref 0–1.5)
BILIRUB SERPL-MCNC: 2.9 MG/DL (ref 0–1.2)
BUN SERPL-MCNC: 13 MG/DL (ref 6–20)
BUN/CREAT SERPL: 13.8 (ref 7–25)
CALCIUM SPEC-SCNC: 8.7 MG/DL (ref 8.6–10.5)
CHLORIDE SERPL-SCNC: 99 MMOL/L (ref 98–107)
CO2 SERPL-SCNC: 28.8 MMOL/L (ref 22–29)
CREAT SERPL-MCNC: 0.94 MG/DL (ref 0.76–1.27)
DEPRECATED RDW RBC AUTO: 43.2 FL (ref 37–54)
EGFRCR SERPLBLD CKD-EPI 2021: 107.1 ML/MIN/1.73
EOSINOPHIL # BLD AUTO: 0 10*3/MM3 (ref 0–0.4)
EOSINOPHIL NFR BLD AUTO: 0 % (ref 0.3–6.2)
ERYTHROCYTE [DISTWIDTH] IN BLOOD BY AUTOMATED COUNT: 14 % (ref 12.3–15.4)
GLOBULIN UR ELPH-MCNC: 2.5 GM/DL
GLUCOSE SERPL-MCNC: 99 MG/DL (ref 65–99)
HCT VFR BLD AUTO: 43.9 % (ref 37.5–51)
HGB BLD-MCNC: 14 G/DL (ref 13–17.7)
IMM GRANULOCYTES # BLD AUTO: 0.04 10*3/MM3 (ref 0–0.05)
IMM GRANULOCYTES NFR BLD AUTO: 0.3 % (ref 0–0.5)
LAB AP CASE REPORT: NORMAL
LIPASE SERPL-CCNC: 81 U/L (ref 13–60)
LYMPHOCYTES # BLD AUTO: 1.13 10*3/MM3 (ref 0.7–3.1)
LYMPHOCYTES NFR BLD AUTO: 9.8 % (ref 19.6–45.3)
MCH RBC QN AUTO: 27.2 PG (ref 26.6–33)
MCHC RBC AUTO-ENTMCNC: 31.9 G/DL (ref 31.5–35.7)
MCV RBC AUTO: 85.2 FL (ref 79–97)
MONOCYTES # BLD AUTO: 0.82 10*3/MM3 (ref 0.1–0.9)
MONOCYTES NFR BLD AUTO: 7.1 % (ref 5–12)
NEUTROPHILS NFR BLD AUTO: 82.6 % (ref 42.7–76)
NEUTROPHILS NFR BLD AUTO: 9.56 10*3/MM3 (ref 1.7–7)
NRBC BLD AUTO-RTO: 0 /100 WBC (ref 0–0.2)
PATH REPORT.FINAL DX SPEC: NORMAL
PATH REPORT.GROSS SPEC: NORMAL
PLATELET # BLD AUTO: 198 10*3/MM3 (ref 140–450)
PMV BLD AUTO: 11 FL (ref 6–12)
POTASSIUM SERPL-SCNC: 4.1 MMOL/L (ref 3.5–5.2)
PROT SERPL-MCNC: 6.2 G/DL (ref 6–8.5)
RBC # BLD AUTO: 5.15 10*6/MM3 (ref 4.14–5.8)
SODIUM SERPL-SCNC: 137 MMOL/L (ref 136–145)
WBC NRBC COR # BLD AUTO: 11.57 10*3/MM3 (ref 3.4–10.8)

## 2024-12-12 PROCEDURE — 85025 COMPLETE CBC W/AUTO DIFF WBC: CPT | Performed by: SURGERY

## 2024-12-12 PROCEDURE — 82150 ASSAY OF AMYLASE: CPT | Performed by: NURSE PRACTITIONER

## 2024-12-12 PROCEDURE — 83690 ASSAY OF LIPASE: CPT | Performed by: NURSE PRACTITIONER

## 2024-12-12 PROCEDURE — 25010000002 HYDROMORPHONE PER 4 MG: Performed by: SURGERY

## 2024-12-12 PROCEDURE — 99231 SBSQ HOSP IP/OBS SF/LOW 25: CPT | Performed by: NURSE PRACTITIONER

## 2024-12-12 PROCEDURE — 80053 COMPREHEN METABOLIC PANEL: CPT | Performed by: NURSE PRACTITIONER

## 2024-12-12 RX ORDER — HYDROCODONE BITARTRATE AND ACETAMINOPHEN 5; 325 MG/1; MG/1
1 TABLET ORAL EVERY 6 HOURS PRN
Qty: 15 TABLET | Refills: 0 | Status: SHIPPED | OUTPATIENT
Start: 2024-12-12 | End: 2024-12-17

## 2024-12-12 RX ADMIN — Medication 10 ML: at 08:37

## 2024-12-12 RX ADMIN — CLONAZEPAM 1 MG: 1 TABLET ORAL at 08:35

## 2024-12-12 RX ADMIN — HYDROMORPHONE HYDROCHLORIDE 0.5 MG: 1 INJECTION, SOLUTION INTRAMUSCULAR; INTRAVENOUS; SUBCUTANEOUS at 01:37

## 2024-12-12 RX ADMIN — HYDROMORPHONE HYDROCHLORIDE 0.5 MG: 1 INJECTION, SOLUTION INTRAMUSCULAR; INTRAVENOUS; SUBCUTANEOUS at 12:01

## 2024-12-12 RX ADMIN — HYDROMORPHONE HYDROCHLORIDE 0.5 MG: 1 INJECTION, SOLUTION INTRAMUSCULAR; INTRAVENOUS; SUBCUTANEOUS at 04:51

## 2024-12-12 RX ADMIN — HYDROMORPHONE HYDROCHLORIDE 0.5 MG: 1 INJECTION, SOLUTION INTRAMUSCULAR; INTRAVENOUS; SUBCUTANEOUS at 08:32

## 2024-12-12 RX ADMIN — METOPROLOL SUCCINATE 25 MG: 25 TABLET, EXTENDED RELEASE ORAL at 08:35

## 2024-12-12 RX ADMIN — LISINOPRIL 20 MG: 20 TABLET ORAL at 08:36

## 2024-12-12 RX ADMIN — HYDROCODONE BITARTRATE AND ACETAMINOPHEN 1 TABLET: 5; 325 TABLET ORAL at 06:42

## 2024-12-12 RX ADMIN — HYDROCHLOROTHIAZIDE 25 MG: 25 TABLET ORAL at 08:35

## 2024-12-12 RX ADMIN — HYDROCODONE BITARTRATE AND ACETAMINOPHEN 1 TABLET: 5; 325 TABLET ORAL at 15:02

## 2024-12-12 NOTE — DISCHARGE SUMMARY
"             [facility] Medicine Services  Discharge Summary    Date of Service: 2024  Patient Name: Henrik Mojica  : 1985  MRN: [id number]    Date of Admission: 2024  Discharge Diagnosis: Abdominal pain  Date of Discharge: 2024  Primary Care Physician: Subhash Graves MD    Presenting Problem:   Epigastric pain [R10.13]  Abdominal pain [R10.9]  Elevated LFTs [R79.89]  History of cholelithiasis [Z87.19]    Active and Resolved Hospital Problems:  Active Hospital Problems    Diagnosis POA    **Abdominal pain [R10.9] Yes      Resolved Hospital Problems    Diagnosis POA    Choledocholithiasis [K80.50] Unknown         Hospital Course     HPI:  \"Henrik Mojica is a 39 y.o. male with a CMH of cholelithiasis, hypertension who presented to [facility] on 2024 with abdominal pain x 4 days..Patient was seen at [place] yesterday and received a CT with contrast of the abdomen and ultrasound of the abdomen as well as labs. Once discharged with diagnosis of elevated LFTs per his report. Patient denies nausea, vomiting, diarrhea, fever, respiratory complaints, chest pain, leg swelling, dysuria. He reports that his urine has been darker over the last day or so. His PCP told him to come here today if his symptoms did not improve, and they have not. He states that he was supposed to be getting his gallbladder removed but that he has not had insurance and has not been able to do that. He takes half an oxycodone 10 at home as needed for his gallbladder pain when it flares up. He had 1 prior to arrival.  Patient was evaluated and placed in observation unit for GI to evaluate.  ERCP was completed which removed 2 common bile duct stones.  Surgery was consulted for evaluation of cholecystectomy \"    Hospital Course:  Pt was admitted, gen sx and GI were consulted,  S/p ERCP was performed utilizing fluoroscopic guidance,status post sphincterotomy and 2 stones removed. .it showed  1. Dilated " biliary tree with small rounded filling defects in the common hepatic and common bile ducts suspicious for choledocholithiasis. Consider ERCP.  2. No evidence of acute cholecystitis or gallstone pancreatitis.  3. Incidental pancreatic divisum.  LFTs went down.  Pt underwent lap cholecystectomy after ERCP for choledocholithiasis. With post ERCP pancreatitis. Clinically improving.   J  His lab work was all neg except mildly elevated anti Mitochondrial ab.  Pt tolerated oral diet and stable for dc as per GI and sx. WBC 11K.    DISCHARGE Follow Up Recommendations for labs and diagnostics:   Fu with pcp, surgery and GI    Reasons For Change In Medications and Indications for New Medications:      Day of Discharge     Vital Signs:  Temp:  [97.6 °F (36.4 °C)-98.5 °F (36.9 °C)] 97.7 °F (36.5 °C)  Heart Rate:  [] 96  Resp:  [15-20] 15  BP: (122-150)/(65-88) 136/82  Flow (L/min) (Oxygen Therapy):  [2] 2    Physical Exam:  Vitals and nursing note reviewed.   Constitutional:       Appearance: Normal appearance.   HENT:      Head: Normocephalic and atraumatic.   Cardiovascular:      Rate and Rhythm: Normal rate and rhythm.      Heart sounds: Normal heart sounds.   Pulmonary:      Effort: Pulmonary effort is normal.      Breath sounds: Decreased breath sounds.   Abdominal:      Palpations: Abdomen is soft. Dressed lap camila incisions  Musculoskeletal:      Cervical back: Neck supple.   Neurological:      Mental Status: Mental status is at baseline.     Pertinent  and/or Most Recent Results     LAB RESULTS:      Lab 12/12/24  0143 12/11/24  0130 12/10/24  0410 12/09/24  1352   WBC 11.57* 11.73* 5.33 5.20   HEMOGLOBIN 14.0 16.0 16.9 16.1   HEMATOCRIT 43.9 49.4 51.0 50.1   PLATELETS 198 223 207 187   NEUTROS ABS 9.56* 10.58* 3.63 3.61   IMMATURE GRANS (ABS) 0.04 0.05 0.03 0.02   LYMPHS ABS 1.13 0.60* 1.08 1.05   MONOS ABS 0.82 0.48 0.48 0.43   EOS ABS 0.00 0.00 0.06 0.05   MCV 85.2 82.7 83.5 84.5         Lab 12/12/24  0143  12/11/24  0130 12/10/24  0410 12/09/24  1352   SODIUM 137 134* 134* 135*   POTASSIUM 4.1 4.7 4.0 3.7   CHLORIDE 99 97* 95* 95*   CO2 28.8 28.1 24.7 30.2*   ANION GAP 9.2 8.9 14.3 9.8   BUN 13 14 14 12   CREATININE 0.94 1.08 1.14 1.25   EGFR 107.1 90.6 84.9 76.1   GLUCOSE 99 120* 86 98   CALCIUM 8.7 9.4 9.9 9.7         Lab 12/12/24  0143 12/11/24  0130 12/10/24  0410 12/09/24  1352   TOTAL PROTEIN 6.2 6.8 7.1 7.1   ALBUMIN 3.7 4.1 4.3 4.4   GLOBULIN 2.5 2.7 2.8 2.7   ALT (SGPT) 424* 537* 636* 636*   AST (SGOT) 152* 197* 319* 329*   BILIRUBIN 2.9* 4.2* 11.0* 8.6*   ALK PHOS 159* 218* 212* 183*   AMYLASE 134* 452*  --   --    LIPASE 81* 1,118* 48 74*                 Lab 12/10/24  0410   IRON 94   FERRITIN 693.00*         Brief Urine Lab Results  (Last result in the past 365 days)        Color   Clarity   Blood   Leuk Est   Nitrite   Protein   CREAT   Urine HCG        12/09/24 1418 Dark Yellow  Comment: Result checked     Clear   Negative   Trace   Negative   Negative                 Microbiology Results (last 10 days)       ** No results found for the last 240 hours. **            FL ERCP pancreatic and biliary ducts    Result Date: 12/10/2024  Impression: Impression: ERCP with fluoroscopy. Please refer to the procedure report for findings and recommendations. Electronically Signed: Jolene Shea MD  12/10/2024 2:06 PM EST  Workstation ID: [id number]    MRI abdomen wo contrast mrcp    Result Date: 12/10/2024  Impression: Impression: 1. Dilated biliary tree with small rounded filling defects in the common hepatic and common bile ducts suspicious for choledocholithiasis. Consider ERCP. 2. No evidence of acute cholecystitis or gallstone pancreatitis. 3. Incidental pancreatic divisum. Electronically Signed: Agus Grande MD  12/10/2024 7:44 AM EST  Workstation ID: [id number]                 Labs Pending at Discharge:  Pending Results       None            Procedures Performed  Procedure(s):  Robotic assisted laparoscopic  "cholecystectomy, possible open, possible cholangiogram  12/10 1301 ERCP    Consults:   Consults       Date and Time Order Name Status Description    12/10/2024  3:25 PM Inpatient Hospitalist Consult      12/10/2024  2:59 PM Inpatient General Surgery Consult Completed             Discharge Details        Discharge Medications        New Medications        Instructions Start Date   HYDROcodone-acetaminophen 5-325 MG per tablet  Commonly known as: Norco   1 tablet, Oral, Every 6 Hours PRN             Continue These Medications        Instructions Start Date   clonazePAM 1 MG tablet  Commonly known as: KlonoPIN   1 mg, Daily      lisinopril-hydrochlorothiazide 20-25 MG per tablet  Commonly known as: PRINZIDE,ZESTORETIC   1 tablet, Daily      metoprolol succinate XL 25 MG 24 hr tablet  Commonly known as: TOPROL-XL   1 tablet, Daily      Testosterone Cypionate 200 MG/ML injection  Commonly known as: DEPOTESTOTERONE CYPIONATE   Inject 1 mL into the appropriate muscle as directed by prescriber Every 7 (Seven) Days. Sun      Vitamin E 180 MG (400 UNIT) capsule capsule   Take 1 capsule by mouth 2 (Two) Times a Day.               Allergies   Allergen Reactions    Penicillins Other (See Comments)     Family history  Beta lactam allergy details  Antibiotic reaction: other (patient states he has never had a reaction, was told by his mother to \"never take it\")  Tolerated antibiotics: amoxicillin, augmentin, cephalexin           Discharge Disposition:   Home or Self Care    Diet:  Diet Instructions       Diet: Cardiac Diets, Gastrointestinal Diets; Low Sodium (2g); Soft to Chew (NDD 3); Whole Meat; Thin (IDDSI 0); Fiber-Restricted      Discharge Diet:  Cardiac Diets  Gastrointestinal Diets       Cardiac Diet: Low Sodium (2g)    Texture: Soft to Chew (NDD 3)    Soft to Chew: Whole Meat    Fluid Consistency: Thin (IDDSI 0)    Gastrointestinal Diet: Fiber-Restricted            Discharge Activity:   Activity Instructions       Activity " as Tolerated              CODE STATUS:  Code Status and Medical Interventions: CPR (Attempt to Resuscitate); Full Support   Ordered at: 12/09/24 1521     Level Of Support Discussed With:    Patient     Code Status (Patient has no pulse and is not breathing):    CPR (Attempt to Resuscitate)     Medical Interventions (Patient has pulse or is breathing):    Full Support       No future appointments.    Additional Instructions for the Follow-ups that You Need to Schedule       Discharge Follow-up with PCP   As directed       Currently Documented PCP:    Subhash Graves MD    PCP Phone Number:    [phone]     Follow Up Details: 2-3 d        Discharge Follow-up with Specified Provider: GI; 3 Weeks   As directed      To: GI   Follow Up: 3 Weeks   Follow Up Details: f/u on lfts        Discharge Follow-up with Specified Provider: surgery; 2 Weeks   As directed      To: surgery   Follow Up: 2 Weeks   Follow Up Details: f/u on path                Time spent on Discharge including face to face service:  >30 minutes    Signature: Electronically signed by Chadd Pacheco MD, 12/12/24, 13:55 EST.  [facility] Hospitalist Team

## 2024-12-12 NOTE — DISCHARGE INSTRUCTIONS
Call the office to schedule followup appointment approx 2 wks postop  Keep incisions dry for first 24-48 hrs then may remove overlying bandages but leave white steristrips in place  May shower soap and water after bandages are removed but no baths/soaking x 2 weeks  Low fat diet x 2 wks  No lifting >10-15 lbs x 2 wks  Over the counter stool softener twice daily until off narcotics and having regular bowel movements  Milk of magnesia as needed if still constipated with stool softeners   May not work or drive while on narcotics

## 2024-12-12 NOTE — PLAN OF CARE
Problem: Adult Inpatient Plan of Care  Goal: Plan of Care Review  Outcome: Progressing  Flowsheets (Taken 12/12/2024 0601)  Progress: improving  Plan of Care Reviewed With: patient  Goal: Patient-Specific Goal (Individualized)  Outcome: Progressing  Goal: Absence of Hospital-Acquired Illness or Injury  Outcome: Progressing  Intervention: Identify and Manage Fall Risk  Recent Flowsheet Documentation  Taken 12/12/2024 0216 by Laurie Sanders RN  Safety Promotion/Fall Prevention: safety round/check completed  Taken 12/12/2024 0010 by Laurie Sanders RN  Safety Promotion/Fall Prevention: safety round/check completed  Taken 12/11/2024 2340 by Laurie Sanders RN  Safety Promotion/Fall Prevention: safety round/check completed  Taken 12/11/2024 2234 by Laurie Sanders RN  Safety Promotion/Fall Prevention: safety round/check completed  Taken 12/11/2024 2100 by Laurie Sanders RN  Safety Promotion/Fall Prevention: safety round/check completed  Taken 12/11/2024 2000 by Laurie Sanders RN  Safety Promotion/Fall Prevention: safety round/check completed  Intervention: Prevent and Manage VTE (Venous Thromboembolism) Risk  Recent Flowsheet Documentation  Taken 12/11/2024 2000 by Laurie Sanders RN  VTE Prevention/Management: patient refused intervention  Goal: Optimal Comfort and Wellbeing  Outcome: Progressing  Intervention: Monitor Pain and Promote Comfort  Recent Flowsheet Documentation  Taken 12/12/2024 0451 by Laurie Sanders RN  Pain Management Interventions: pain medication given  Intervention: Provide Person-Centered Care  Recent Flowsheet Documentation  Taken 12/11/2024 2000 by Laurie Sanders RN  Trust Relationship/Rapport:   care explained   questions answered  Goal: Readiness for Transition of Care  Outcome: Progressing     Problem: Cholecystectomy  Goal: Absence of Infection Signs and Symptoms  Outcome: Progressing  Goal: Anesthesia/Sedation Recovery  Outcome: Progressing  Intervention:  Optimize Anesthesia Recovery  Recent Flowsheet Documentation  Taken 12/12/2024 0216 by Laurie Sanders RN  Safety Promotion/Fall Prevention: safety round/check completed  Taken 12/12/2024 0010 by Laurie Sanders RN  Safety Promotion/Fall Prevention: safety round/check completed  Taken 12/11/2024 2340 by Laurie Sanders RN  Safety Promotion/Fall Prevention: safety round/check completed  Taken 12/11/2024 2234 by Laurie Sanders RN  Safety Promotion/Fall Prevention: safety round/check completed  Taken 12/11/2024 2100 by Laurie Sanders RN  Safety Promotion/Fall Prevention: safety round/check completed  Taken 12/11/2024 2000 by Laurie Sanders RN  Safety Promotion/Fall Prevention: safety round/check completed  Goal: Pain Control and Function  Outcome: Progressing  Intervention: Prevent or Manage Pain  Recent Flowsheet Documentation  Taken 12/12/2024 0451 by Laurie Sanders RN  Pain Management Interventions: pain medication given   Goal Outcome Evaluation:  Plan of Care Reviewed With: patient        Progress: improving

## 2024-12-12 NOTE — PROGRESS NOTES
LOS: 2 days   Patient Care Team:  Subhash Graves MD as PCP - General (Family Medicine)  Lyndon Yan MD as Consulting Physician (General Surgery)      Subjective     Subjective: Patient with some postoperative tenderness today and feels gassy.  Has been tolerating some liquids and is hungry.  No nausea or vomiting.  Has not had a bowel movement.      ROS:   Review of Systems   Constitutional:  Negative for chills and fever.   Respiratory:  Negative for cough and shortness of breath.    Gastrointestinal:  Positive for abdominal distention and abdominal pain. Negative for blood in stool, nausea and vomiting.   Neurological:  Negative for dizziness and weakness.   Psychiatric/Behavioral:  Negative for agitation and confusion.         Medication Review:   Scheduled Meds:clonazePAM, 1 mg, Oral, Daily  lisinopril, 20 mg, Oral, Q24H   And  hydroCHLOROthiazide, 25 mg, Oral, Q24H  metoprolol succinate XL, 25 mg, Oral, Daily  sodium chloride, 10 mL, Intravenous, Q12H      Continuous Infusions:lactated ringers, 75 mL/hr, Last Rate: 75 mL/hr (12/12/24 0615)  phenylephrine, 0.5-3 mcg/kg/min  phenylephrine, 0.5-3 mcg/kg/min  sodium chloride, 125 mL/hr, Last Rate: 125 mL/hr (12/11/24 1257)      PRN Meds:.  aluminum-magnesium hydroxide-simethicone    senna-docusate sodium **AND** polyethylene glycol **AND** bisacodyl **AND** bisacodyl    HYDROcodone-acetaminophen    HYDROmorphone    ibuprofen    ondansetron ODT **OR** ondansetron    oxyCODONE    phenylephrine    phenylephrine    sodium chloride    sodium chloride      Objective     Vital Signs  Temp:  [97.6 °F (36.4 °C)-98.5 °F (36.9 °C)] 98.5 °F (36.9 °C)  Heart Rate:  [] 106  Resp:  [8-20] 19  BP: (108-150)/(65-89) 137/87    Physical Exam:    General Appearance:    Awake and alert, in no acute distress   Head:    Normocephalic, without obvious abnormality   Eyes:          Conjunctivae normal, anicteric sclera   Ears:    Hearing intact   Throat:   No oral  lesions, no thrush, oral mucosa moist       Lungs:     Respirations regular, even and unlabored       Abdomen:     Soft, generalized tenderness, no rebound or guarding, non-distended, no hepatosplenomegaly   Rectal:     Deferred   Extremities:   No edema, no cyanosis, no redness   Skin:   No bleeding, bruising or rash, no jaundice   Neurologic:   Sensation intact        Results Review:    CBC  Results from last 7 days   Lab Units 12/12/24  0143 12/11/24  0130 12/10/24  0410 12/09/24  1352   RBC 10*6/mm3 5.15 5.97* 6.11* 5.93*   WBC 10*3/mm3 11.57* 11.73* 5.33 5.20   HEMOGLOBIN g/dL 14.0 16.0 16.9 16.1   PLATELETS 10*3/mm3 198 223 207 187       CMP  Results from last 7 days   Lab Units 12/12/24  0143 12/11/24  0130 12/10/24  0410 12/09/24  1352   SODIUM mmol/L 137 134* 134* 135*   POTASSIUM mmol/L 4.1 4.7 4.0 3.7   CHLORIDE mmol/L 99 97* 95* 95*   CO2 mmol/L 28.8 28.1 24.7 30.2*   BUN mg/dL 13 14 14 12   CREATININE mg/dL 0.94 1.08 1.14 1.25   GLUCOSE mg/dL 99 120* 86 98   ALBUMIN g/dL 3.7 4.1 4.3 4.4   BILIRUBIN mg/dL 2.9* 4.2* 11.0* 8.6*   ALK PHOS U/L 159* 218* 212* 183*   AST (SGOT) U/L 152* 197* 319* 329*   ALT (SGPT) U/L 424* 537* 636* 636*       Amylase and Lipase  Results from last 7 days   Lab Units 12/12/24  0143 12/11/24  0130 12/10/24  0410 12/09/24  1352   AMYLASE U/L 134* 452*  --   --    LIPASE U/L 81* 1,118* 48 74*       CRP         Imaging Results (Last 24 Hours)       ** No results found for the last 24 hours. **              Assessment & Plan   Choledocholithiasis  Upper abdominal pain  Elevated liver enzymes  Pancreatitis  Cholelithiasis        Plan:  39-year-old male admitted 12/9/2024 with worsening abdominal pain and history of gallstones.  Found to have significant increase in liver enzymes and choledocholithiasis on MRCP.     Status post ERCP 12/10/2024 by Dr. Romeo with choledocholithiasis status post sphincterotomy and 2 stones removed.     Patient reports some generalized tenderness of  the abdomen and feels gassy.  No severe abdominal pain overnight.  No nausea or vomiting.  Has not had a bowel movement.  Lipase greatly improved down to 81.  Amylase 134.  Enzymes continue to improve as well.  Total bilirubin down to 2.9, alk phos 159, , and .  WBC 11.57.  Hemoglobin normal.  Plan to advance his diet today.  If he tolerates this, he could be discharged home later today.  He will need to follow-up in our office as outpatient and we will repeat liver enzymes. Continue supportive care.    Carlota Orozco, APRN  12/12/24  10:59 EST

## 2024-12-12 NOTE — PROGRESS NOTES
General Surgery Inpatient Progress Note      Name: Henrik Mojica ADMIT: 2024   : 1985  PCP: Subhash Graves MD    MRN: [id number] LOS: 2 days   AGE/SEX: 39 y.o. male  ROOM: [address] [place] [address]      Patient Care Team:  Subhash Graves MD as PCP - General (Family Medicine)  Lyndon Yan MD as Consulting Physician (General Surgery)  Chief Complaint   Patient presents with    Abdominal Pain     Pt has abd pain, above belly button, pt was dx last night at [place] with pancreatitis.  Pt nauseated.         Subjective:  Patient seen examined and reports pain is improving but reports incisional pain.  Denies any nausea or vomiting.  We discussed post ERCP pancreatitis and that his numbers are improving.    Medications:  clonazePAM, 1 mg, Oral, Daily  lisinopril, 20 mg, Oral, Q24H   And  hydroCHLOROthiazide, 25 mg, Oral, Q24H  metoprolol succinate XL, 25 mg, Oral, Daily  sodium chloride, 10 mL, Intravenous, Q12H         aluminum-magnesium hydroxide-simethicone    senna-docusate sodium **AND** polyethylene glycol **AND** bisacodyl **AND** bisacodyl    HYDROcodone-acetaminophen    HYDROmorphone    ibuprofen    ondansetron ODT **OR** ondansetron    oxyCODONE    phenylephrine    phenylephrine    sodium chloride    sodium chloride     Vitals:  Temp:  [97.6 °F (36.4 °C)-98.5 °F (36.9 °C)] 98.5 °F (36.9 °C)  Heart Rate:  [] 106  Resp:  [8-20] 19  BP: (108-150)/(65-89) 137/87     Physical Exam:  Acute distress, alert  Nonlabored respirations  Abdomen soft, appropriately tender to palpation around incisions, mildly tender to palpation in the epigastrium    Labs:  Results from last 7 days   Lab Units 24  0143 24  0130 12/10/24  0410   WBC 10*3/mm3 11.57* 11.73* 5.33   HEMOGLOBIN g/dL 14.0 16.0 16.9   HEMATOCRIT % 43.9 49.4 51.0   PLATELETS 10*3/mm3 198 223 207     Results from last 7 days   Lab Units 24  0143 24  0130 12/10/24  0410   SODIUM mmol/L 137 134* 134*    POTASSIUM mmol/L 4.1 4.7 4.0   CHLORIDE mmol/L 99 97* 95*   CO2 mmol/L 28.8 28.1 24.7   BUN mg/dL 13 14 14   CREATININE mg/dL 0.94 1.08 1.14   CALCIUM mg/dL 8.7 9.4 9.9   BILIRUBIN mg/dL 2.9* 4.2* 11.0*   ALK PHOS U/L 159* 218* 212*   ALT (SGPT) U/L 424* 537* 636*   AST (SGOT) U/L 152* 197* 319*   GLUCOSE mg/dL 99 120* 86     Lipase 81, down from 1118    Assessment and Plan:  39 y.o. male postop day #1 status post robotic assisted laparoscopic cholecystectomy after ERCP for choledocholithiasis.  With post ERCP pancreatitis.  Clinically improving.    -From a surgical standpoint the case went straightforward so when he is otherwise medically cleared and cleared by gastroenterology for discharge she is okay to discharge home  -Will defer diet to GI    This note was created using Dragon Voice Recognition software.    Keri Hardin MD  12/12/24  10:42 EST

## 2024-12-13 NOTE — OUTREACH NOTE
Prep Survey      Flowsheet Row Responses   [facility] facility patient discharged from? [facility]   Is LACE score < 7 ? No   Eligibility Readm Mgmt   Discharge diagnosis ERCP-Abdominal pain  Principal problem   Does the patient have one of the following disease processes/diagnoses(primary or secondary)? General Surgery   Does the patient have Home health ordered? No   Is there a DME ordered? No   Prep survey completed? Yes            JOYCE MONTANA - Registered Nurse

## 2024-12-16 ENCOUNTER — READMISSION MANAGEMENT (OUTPATIENT)
Dept: CALL CENTER | Facility: HOSPITAL | Age: 39
End: 2024-12-16

## 2024-12-16 ENCOUNTER — TELEPHONE (OUTPATIENT)
Dept: SURGERY | Facility: CLINIC | Age: 39
End: 2024-12-16

## 2024-12-16 NOTE — OUTREACH NOTE
General Surgery Week 1 Survey      Flowsheet Row Responses   [facility] patient discharged from? [facility]   Does the patient have one of the following disease processes/diagnoses(primary or secondary)? General Surgery   Week 1 attempt successful? Yes   Call start time 1307   Call end time 1311   Discharge diagnosis ERCP-Abdominal pain  Principal problem   Person spoke with today (if not patient) and relationship Patient   Medication alerts for this patient Norco   Meds reviewed with patient/caregiver? Yes   Is the patient having any side effects they believe may be caused by any medication additions or changes? No   Does the patient have all medications related to this admission filled (includes all antibiotics, pain medications, etc.) Yes   Is the patient taking all medications as directed (includes completed medication regime)? Yes   Does the patient have a follow up appointment scheduled with their surgeon? Yes   Has the patient kept scheduled appointments due by today? N/A   Comments Surgery post op appt on 12/26/24 at 1:40 PM with APOLINAR Wilkins.   Has home health visited the patient within 72 hours of discharge? N/A   Psychosocial issues? No   Comments Patient states he is having a hard time resting. The most time he has slept has been 4 hours straight. Patient states he messaged PCP, Dr. Subhash Graves to get advice on medication to aid in sleep. Patient is also waiting on callback from GI for f/u appt.   Did the patient receive a copy of their discharge instructions? Yes   Nursing interventions Reviewed instructions with patient   What is the patient's perception of their health status since discharge? Improving   Nursing interventions Nurse provided patient education   Is the patient /caregiver able to teach back basic post-op care? Take showers only when approved by MD-sponge bathe until then, No tub bath, swimming, or hot tub until instructed by MD, Keep incision areas clean,dry and protected,  Do not remove steri-strips, Lifting as instructed by MD in discharge instructions   Is the patient/caregiver able to teach back signs and symptoms of incisional infection? Increased redness, swelling or pain at the incisonal site, Increased drainage or bleeding, Incisional warmth, Pus or odor from incision, Fever   Is the patient/caregiver able to teach back steps to recovery at home? Set small, achievable goals for return to baseline health, Rest and rebuild strength, gradually increase activity   If the patient is a current smoker, are they able to teach back resources for cessation? Not a smoker   Is the patient/caregiver able to teach back the hierarchy of who to call/visit for symptoms/problems? PCP, Specialist, Home health nurse, Urgent Care, ED, 911 Yes   Week 1 call completed? Yes   Graduated Yes   Is the patient interested in additional calls from an ambulatory ? No   Would this patient benefit from a Referral to Amb Social Work? No   Wrap up additional comments Patient doing well, eating and drinking without difficulty. No needs or concerns. f/u appt scheduled.   Call end time 1311            Rere MCKEON - Registered Nurse

## 2024-12-16 NOTE — TELEPHONE ENCOUNTER
Patient called and reports doing better.  PO Scheduled.   [Healthy eating counseling provided] : healthy eating [Activity counseling provided] : activity

## 2025-01-02 ENCOUNTER — OFFICE VISIT (OUTPATIENT)
Dept: SURGERY | Facility: CLINIC | Age: 40
End: 2025-01-02

## 2025-01-02 VITALS
HEART RATE: 94 BPM | TEMPERATURE: 98.2 F | BODY MASS INDEX: 27.85 KG/M2 | HEIGHT: 75 IN | SYSTOLIC BLOOD PRESSURE: 121 MMHG | WEIGHT: 224 LBS | DIASTOLIC BLOOD PRESSURE: 75 MMHG | OXYGEN SATURATION: 97 %

## 2025-01-02 DIAGNOSIS — K80.50 CHOLEDOCHOLITHIASIS: Primary | ICD-10-CM

## 2025-01-02 RX ORDER — ANASTROZOLE 1 MG/1
TABLET ORAL
COMMUNITY
Start: 2024-12-21

## 2025-01-02 NOTE — PROGRESS NOTES
"Chief Complaint  Post-op (Post op 12/11/24 Jayda Paredes, Dr. Hardin)    Subjective        Henrik Mojica presents to [facility] GENERAL SURGERY status post robotic assisted laparoscopic cholecystectomy with Dr. Hardin on 12/11/2024.  Doing well, no complaints.  Denies fevers and chills.  Denies pain.  Tolerating regular diet without nausea and vomiting.  Having regular bowel function.      Objective   Vital Signs:  /75 (BP Location: Left arm, Patient Position: Sitting, Cuff Size: Large Adult)   Pulse 94   Temp 98.2 °F (36.8 °C) (Infrared)   Ht 190.5 cm (75\")   Wt 102 kg (224 lb)   SpO2 97%   BMI 28.00 kg/m²   Estimated body mass index is 28 kg/m² as calculated from the following:    Height as of this encounter: 190.5 cm (75\").    Weight as of this encounter: 102 kg (224 lb).            Physical Exam  Constitutional:       General: He is not in acute distress.  Cardiovascular:      Rate and Rhythm: Normal rate.   Pulmonary:      Effort: Pulmonary effort is normal. No respiratory distress.   Abdominal:      General: There is no distension.      Palpations: Abdomen is soft.      Tenderness: There is no abdominal tenderness. There is no guarding.      Comments: Incisions appear to be healing.   Neurological:      Mental Status: He is alert. Mental status is at baseline.   Psychiatric:         Mood and Affect: Mood normal.         Behavior: Behavior normal.        Result Review :                Assessment and Plan   Diagnoses and all orders for this visit:    1. Choledocholithiasis (Primary)    status post robotic assisted laparoscopic cholecystectomy with Dr. Hardin on 12/11/2024.  Doing well, no complaints.  Denies fevers and chills.  Denies pain.  Tolerating regular diet without nausea and vomiting.  Having regular bowel function.  Pathology discussed with patient.  No further physical restrictions.  Can follow-up as needed.         Follow Up   No follow-ups on file.  Patient was given " instructions and counseling regarding his condition or for health maintenance advice. Please see specific information pulled into the AVS if appropriate.

## 2025-01-24 ENCOUNTER — HOSPITAL ENCOUNTER (EMERGENCY)
Facility: HOSPITAL | Age: 40
Discharge: HOME | End: 2025-01-24
Attending: EMERGENCY MEDICINE
Payer: OTHER MISCELLANEOUS

## 2025-01-24 ENCOUNTER — APPOINTMENT (OUTPATIENT)
Dept: CT IMAGING | Facility: HOSPITAL | Age: 40
End: 2025-01-24
Payer: OTHER MISCELLANEOUS

## 2025-01-24 VITALS
OXYGEN SATURATION: 99 % | TEMPERATURE: 98 F | SYSTOLIC BLOOD PRESSURE: 128 MMHG | HEIGHT: 75 IN | DIASTOLIC BLOOD PRESSURE: 85 MMHG | WEIGHT: 235 LBS | BODY MASS INDEX: 29.22 KG/M2 | RESPIRATION RATE: 18 BRPM | HEART RATE: 101 BPM

## 2025-01-24 DIAGNOSIS — S09.90XA CLOSED HEAD INJURY, INITIAL ENCOUNTER: ICD-10-CM

## 2025-01-24 DIAGNOSIS — S01.01XA LACERATION OF SCALP, INITIAL ENCOUNTER: Primary | ICD-10-CM

## 2025-01-24 PROCEDURE — 99282 EMERGENCY DEPT VISIT SF MDM: CPT | Performed by: EMERGENCY MEDICINE

## 2025-01-24 PROCEDURE — 90471 IMMUNIZATION ADMIN: CPT | Performed by: EMERGENCY MEDICINE

## 2025-01-24 PROCEDURE — 90715 TDAP VACCINE 7 YRS/> IM: CPT | Performed by: EMERGENCY MEDICINE

## 2025-01-24 PROCEDURE — 12002 RPR S/N/AX/GEN/TRNK2.6-7.5CM: CPT | Performed by: EMERGENCY MEDICINE

## 2025-01-24 PROCEDURE — 25010000002 TETANUS-DIPHTH-ACELL PERTUSSIS 5-2.5-18.5 LF-MCG/0.5 SUSPENSION PREFILLED SYRINGE: Performed by: EMERGENCY MEDICINE

## 2025-01-24 PROCEDURE — 70450 CT HEAD/BRAIN W/O DYE: CPT

## 2025-01-24 PROCEDURE — 99284 EMERGENCY DEPT VISIT MOD MDM: CPT

## 2025-01-24 RX ORDER — IBUPROFEN 400 MG/1
800 TABLET, FILM COATED ORAL ONCE
Status: COMPLETED | OUTPATIENT
Start: 2025-01-24 | End: 2025-01-24

## 2025-01-24 RX ADMIN — IBUPROFEN 800 MG: 400 TABLET, FILM COATED ORAL at 07:56

## 2025-01-24 RX ADMIN — TETANUS TOXOID, REDUCED DIPHTHERIA TOXOID AND ACELLULAR PERTUSSIS VACCINE, ADSORBED 0.5 ML: 5; 2.5; 8; 8; 2.5 SUSPENSION INTRAMUSCULAR at 07:56

## 2025-01-24 NOTE — FSED PROVIDER NOTE
"Subjective   History of Present Illness  Pt presents after having drill fall on his head from roughly 20ft up, hit occiput of scalp leaving large laceration, mild ha without neck pain or loc, no n/v or abd pain, pt denies any other injuries, no alleviating factors    History provided by:  Patient   used: No        Review of Systems   Respiratory:  Negative for apnea.    Cardiovascular:  Negative for chest pain.   Skin:  Positive for wound.   All other systems reviewed and are negative.      Past Medical History:   Diagnosis Date    Cholelithiasis 12/9/23    Hypertension ?    On 2 medications for it.    Substance abuse 2005    Drug free since 09/09/2014       Allergies   Allergen Reactions    Penicillins Other (See Comments)     Family history  Beta lactam allergy details  Antibiotic reaction: other (patient states he has never had a reaction, was told by his mother to \"never take it\")  Tolerated antibiotics: amoxicillin, augmentin, cephalexin           Past Surgical History:   Procedure Laterality Date    CHOLECYSTECTOMY N/A 12/11/2024    Procedure: Robotic assisted laparoscopic cholecystectomy, possible open, possible cholangiogram;  Surgeon: Keri Hardin MD;  Location: [facility] MAIN OR;  Service: Robotics - DaVinci;  Laterality: N/A;    ERCP N/A 12/10/2024    Procedure: ENDOSCOPIC RETROGRADE CHOLANGIOPANCREATOGRAPHY WITH SPHINCTEROTOMY, BALLOON CLEARANCE OF BILE DUCT, AND STONE EXTRACTION;  Surgeon: Narinder Romeo MD;  Location: [facility] ENDOSCOPY;  Service: Gastroenterology;  Laterality: N/A;  post: stones       Family History   Problem Relation Age of Onset    Drug abuse Brother     Drug abuse Sister     Drug abuse Sister     Drug abuse Sister        Social History     Socioeconomic History    Marital status:    Tobacco Use    Smoking status: Former     Current packs/day: 0.00     Average packs/day: 2.0 packs/day for 23.7 years (47.3 ttl pk-yrs)     Types: Cigarettes, Cigars     Start " date: 1999     Quit date: 2022     Years since quittin.4     Passive exposure: Past    Smokeless tobacco: Never   Vaping Use    Vaping status: Never Used   Substance and Sexual Activity    Alcohol use: Yes     Alcohol/week: 2.0 - 4.0 standard drinks of alcohol     Types: 1 - 2 Cans of beer, 1 - 2 Drinks containing 0.5 oz of alcohol per week     Comment: Very rare social drinker. Enjoy a drink with my dinner somet    Drug use: Not Currently     Types: Cocaine(coke), Heroin, Hydrocodone, Morphine, Oxycodone     Comment: Drug free since 2014    Sexual activity: Yes     Partners: Female     Birth control/protection: None           Objective   Physical Exam  Vitals and nursing note reviewed.   HENT:      Head:      Comments: Large scalp laceration     Nose: Nose normal.      Mouth/Throat:      Mouth: Mucous membranes are moist.   Eyes:      Extraocular Movements: Extraocular movements intact.      Conjunctiva/sclera: Conjunctivae normal.   Cardiovascular:      Rate and Rhythm: Normal rate.   Pulmonary:      Effort: Pulmonary effort is normal.   Musculoskeletal:         General: Normal range of motion.      Cervical back: Normal range of motion. No tenderness.   Skin:     General: Skin is warm.   Neurological:      General: No focal deficit present.      Mental Status: He is alert.   Psychiatric:         Mood and Affect: Mood normal.         Laceration Repair    Date/Time: 2025 7:41 AM    Performed by: William Galan MD  Authorized by: William Galan MD    Consent:     Consent obtained:  Verbal    Consent given by:  Patient    Risks, benefits, and alternatives were discussed: yes      Risks discussed:  Infection and need for additional repair    Alternatives discussed:  No treatment  Anesthesia:     Anesthesia method:  None  Laceration details:     Location:  Scalp    Scalp location:  Crown    Length (cm):  6  Exploration:     Limited defect created (wound extended): no      Hemostasis achieved  with:  Direct pressure    Imaging outcome: foreign body not noted      Wound exploration: entire depth of wound visualized      Contaminated: no    Treatment:     Area cleansed with:  Saline    Amount of cleaning:  Standard    Irrigation solution:  Tap water    Visualized foreign bodies/material removed: no      Debridement:  None    Undermining:  None    Scar revision: no      Layers/structures repaired:  Deep dermal/superficial fascia  Skin repair:     Repair method:  Staples  Approximation:     Approximation:  Close  Repair type:     Repair type:  Simple  Post-procedure details:     Dressing:  Open (no dressing)    Procedure completion:  Tolerated well, no immediate complications             ED Course                                           Medical Decision Making  Ct head shows no active disease    Amount and/or Complexity of Data Reviewed  Radiology: ordered. Decision-making details documented in ED Course.    Risk  Prescription drug management.        Final diagnoses:   Laceration of scalp, initial encounter   Closed head injury, initial encounter       ED Disposition  ED Disposition       ED Disposition   Discharge    Condition   Stable    Comment   --               Subhash Graves MD  [address]  [place] [address]  [phone]    In 3 days  If symptoms worsen         Medication List      No changes were made to your prescriptions during this visit.

## 2025-05-01 NOTE — PLAN OF CARE
Goal Outcome Evaluation:              Outcome Evaluation: D/C                              HTN (hypertension)

## (undated) DEVICE — TBG INSUFF MALE L/L W 12MM CON: Brand: MEDLINE INDUSTRIES, INC.

## (undated) DEVICE — BG RETRV TISS SUPERBAG INTRO RIP/STOP NLY 5/7MM 140ML MD

## (undated) DEVICE — GLOVE,SURG,SENSICARE SLT,LF,PF,6: Brand: MEDLINE

## (undated) DEVICE — ENDOPATH XCEL BLADELESS TROCARS WITH STABILITY SLEEVES: Brand: ENDOPATH XCEL

## (undated) DEVICE — ANTIBACTERIAL UNDYED BRAIDED (POLYGLACTIN 910), SYNTHETIC ABSORBABLE SUTURE: Brand: COATED VICRYL

## (undated) DEVICE — DEV POSITION LK AND BIOP CAP SYS

## (undated) DEVICE — GENERAL LAPAROSCOPY CDS: Brand: MEDLINE INDUSTRIES, INC.

## (undated) DEVICE — BITEBLOCK ENDO W/STRAP 60F A/ LF DISP

## (undated) DEVICE — GLV SURG SENSICARE POLYISPRN W/ALOE PF LF 6.5 GRN STRL

## (undated) DEVICE — SUT VIC 0 UR6 27IN VCP603H

## (undated) DEVICE — THE STERILE CAMERA HANDLE COVER IS FOR USE WITH THE STERIS SURGICAL LIGHTING AND VISUALIZATION SYSTEMS.

## (undated) DEVICE — PAD, GROUNDING, UNIVERSAL, SPLIT, 9': Brand: MEDLINE

## (undated) DEVICE — BLADELESS OBTURATOR: Brand: WECK VISTA

## (undated) DEVICE — COLUMN DRAPE

## (undated) DEVICE — PASS SUT PRO BARIATRIC XL W/TROC SWABS

## (undated) DEVICE — PK ENDO GI 50

## (undated) DEVICE — GAUZE,SPONGE,4"X4",16PLY,XRAY,STRL,LF: Brand: MEDLINE

## (undated) DEVICE — RETRIEVAL BALLOON CATHETER: Brand: EXTRACTOR™ PRO RX

## (undated) DEVICE — THE STERILE LIGHT HANDLE COVER IS USED WITH STERIS SURGICAL LIGHTING AND VISUALIZATION SYSTEMS.

## (undated) DEVICE — ARM DRAPE

## (undated) DEVICE — SPHINCTEROTOME: Brand: DREAMTOME™ RX 44

## (undated) DEVICE — BLANKT WARM UPPR/BDY ARM/OUT 57X196CM

## (undated) DEVICE — SEAL

## (undated) DEVICE — SYR LUERLOK 30CC

## (undated) DEVICE — ENDOPATH PNEUMONEEDLE INSUFFLATION NEEDLES WITH LUER LOCK CONNECTORS 120MM: Brand: ENDOPATH

## (undated) DEVICE — KT SURG TURNOVER 050